# Patient Record
Sex: FEMALE | Race: BLACK OR AFRICAN AMERICAN | NOT HISPANIC OR LATINO | Employment: FULL TIME | URBAN - METROPOLITAN AREA
[De-identification: names, ages, dates, MRNs, and addresses within clinical notes are randomized per-mention and may not be internally consistent; named-entity substitution may affect disease eponyms.]

---

## 2017-01-20 ENCOUNTER — PATIENT MESSAGE (OUTPATIENT)
Dept: BARIATRICS | Facility: CLINIC | Age: 47
End: 2017-01-20

## 2017-01-20 DIAGNOSIS — Z98.84 STATUS POST LAPAROSCOPIC SLEEVE GASTRECTOMY: ICD-10-CM

## 2017-01-24 RX ORDER — OMEPRAZOLE 40 MG/1
40 CAPSULE, DELAYED RELEASE ORAL DAILY
Qty: 30 CAPSULE | Refills: 11 | Status: SHIPPED | OUTPATIENT
Start: 2017-01-24 | End: 2017-09-29 | Stop reason: SDUPTHER

## 2017-02-03 ENCOUNTER — CLINICAL SUPPORT (OUTPATIENT)
Dept: INTERNAL MEDICINE | Facility: CLINIC | Age: 47
End: 2017-02-03
Attending: INTERNAL MEDICINE
Payer: COMMERCIAL

## 2017-02-03 ENCOUNTER — OFFICE VISIT (OUTPATIENT)
Dept: SLEEP MEDICINE | Facility: CLINIC | Age: 47
End: 2017-02-03
Payer: COMMERCIAL

## 2017-02-03 ENCOUNTER — HOSPITAL ENCOUNTER (OUTPATIENT)
Dept: RADIOLOGY | Facility: HOSPITAL | Age: 47
Discharge: HOME | End: 2017-02-03
Attending: INTERNAL MEDICINE
Payer: COMMERCIAL

## 2017-02-03 ENCOUNTER — OFFICE VISIT (OUTPATIENT)
Dept: PULMONOLOGY | Facility: CLINIC | Age: 47
End: 2017-02-03
Payer: COMMERCIAL

## 2017-02-03 ENCOUNTER — HOSPITAL ENCOUNTER (OUTPATIENT)
Dept: RADIOLOGY | Facility: CLINIC | Age: 47
Discharge: HOME | End: 2017-02-03
Attending: INTERNAL MEDICINE
Payer: COMMERCIAL

## 2017-02-03 ENCOUNTER — CLINICAL SUPPORT (OUTPATIENT)
Dept: INFECTIOUS DISEASES | Facility: CLINIC | Age: 47
End: 2017-02-03
Payer: COMMERCIAL

## 2017-02-03 ENCOUNTER — HOSPITAL ENCOUNTER (OUTPATIENT)
Dept: CARDIOLOGY | Facility: CLINIC | Age: 47
Discharge: HOME | End: 2017-02-03
Payer: COMMERCIAL

## 2017-02-03 VITALS
HEIGHT: 65 IN | SYSTOLIC BLOOD PRESSURE: 113 MMHG | DIASTOLIC BLOOD PRESSURE: 77 MMHG | BODY MASS INDEX: 30.64 KG/M2 | WEIGHT: 183.88 LBS | HEART RATE: 71 BPM

## 2017-02-03 VITALS
HEIGHT: 65 IN | BODY MASS INDEX: 30.59 KG/M2 | DIASTOLIC BLOOD PRESSURE: 65 MMHG | WEIGHT: 183.63 LBS | HEART RATE: 64 BPM | SYSTOLIC BLOOD PRESSURE: 100 MMHG

## 2017-02-03 DIAGNOSIS — Z00.00 ROUTINE GENERAL MEDICAL EXAMINATION AT A HEALTH CARE FACILITY: ICD-10-CM

## 2017-02-03 DIAGNOSIS — Z00.00 ANNUAL PHYSICAL EXAM: Primary | ICD-10-CM

## 2017-02-03 DIAGNOSIS — Z00.00 ROUTINE GENERAL MEDICAL EXAMINATION AT A HEALTH CARE FACILITY: Primary | ICD-10-CM

## 2017-02-03 DIAGNOSIS — G47.33 OSA (OBSTRUCTIVE SLEEP APNEA): Primary | ICD-10-CM

## 2017-02-03 LAB
ALBUMIN SERPL BCP-MCNC: 3.7 G/DL
ALP SERPL-CCNC: 41 U/L
ALT SERPL W/O P-5'-P-CCNC: 10 U/L
ANION GAP SERPL CALC-SCNC: 5 MMOL/L
AST SERPL-CCNC: 15 U/L
BILIRUB SERPL-MCNC: 0.4 MG/DL
BUN SERPL-MCNC: 19 MG/DL
CALCIUM SERPL-MCNC: 9.9 MG/DL
CHLORIDE SERPL-SCNC: 107 MMOL/L
CHOLEST/HDLC SERPL: 3.6 {RATIO}
CO2 SERPL-SCNC: 27 MMOL/L
CREAT SERPL-MCNC: 0.9 MG/DL
ERYTHROCYTE [DISTWIDTH] IN BLOOD BY AUTOMATED COUNT: 14.4 %
EST. GFR  (AFRICAN AMERICAN): >60 ML/MIN/1.73 M^2
EST. GFR  (NON AFRICAN AMERICAN): >60 ML/MIN/1.73 M^2
GLUCOSE SERPL-MCNC: 79 MG/DL
HCT VFR BLD AUTO: 38 %
HDL/CHOLESTEROL RATIO: 28.1 %
HDLC SERPL-MCNC: 210 MG/DL
HDLC SERPL-MCNC: 59 MG/DL
HGB BLD-MCNC: 12.2 G/DL
HIV 1+2 AB+HIV1 P24 AG SERPL QL IA: NEGATIVE
LDLC SERPL CALC-MCNC: 133.6 MG/DL
MCH RBC QN AUTO: 26.7 PG
MCHC RBC AUTO-ENTMCNC: 32.1 %
MCV RBC AUTO: 83 FL
NONHDLC SERPL-MCNC: 151 MG/DL
PLATELET # BLD AUTO: 298 K/UL
PMV BLD AUTO: 9.7 FL
POTASSIUM SERPL-SCNC: 4.8 MMOL/L
PROT SERPL-MCNC: 7.5 G/DL
RBC # BLD AUTO: 4.57 M/UL
SODIUM SERPL-SCNC: 139 MMOL/L
TRIGL SERPL-MCNC: 87 MG/DL
WBC # BLD AUTO: 6.91 K/UL

## 2017-02-03 PROCEDURE — 77080 DXA BONE DENSITY AXIAL: CPT | Mod: 26,,, | Performed by: INTERNAL MEDICINE

## 2017-02-03 PROCEDURE — 86703 HIV-1/HIV-2 1 RESULT ANTBDY: CPT

## 2017-02-03 PROCEDURE — 85027 COMPLETE CBC AUTOMATED: CPT

## 2017-02-03 PROCEDURE — 77080 DXA BONE DENSITY AXIAL: CPT | Mod: TC

## 2017-02-03 PROCEDURE — 3074F SYST BP LT 130 MM HG: CPT | Mod: S$GLB,,, | Performed by: PSYCHIATRY & NEUROLOGY

## 2017-02-03 PROCEDURE — 93000 ELECTROCARDIOGRAM COMPLETE: CPT | Mod: S$GLB,,, | Performed by: INTERNAL MEDICINE

## 2017-02-03 PROCEDURE — 3078F DIAST BP <80 MM HG: CPT | Mod: S$GLB,,, | Performed by: INTERNAL MEDICINE

## 2017-02-03 PROCEDURE — 99999 PR PBB SHADOW E&M-EST. PATIENT-LVL III: CPT | Mod: PBBFAC,,, | Performed by: PSYCHIATRY & NEUROLOGY

## 2017-02-03 PROCEDURE — 77067 SCR MAMMO BI INCL CAD: CPT | Mod: 26,,, | Performed by: RADIOLOGY

## 2017-02-03 PROCEDURE — 36415 COLL VENOUS BLD VENIPUNCTURE: CPT

## 2017-02-03 PROCEDURE — 3074F SYST BP LT 130 MM HG: CPT | Mod: S$GLB,,, | Performed by: INTERNAL MEDICINE

## 2017-02-03 PROCEDURE — 80053 COMPREHEN METABOLIC PANEL: CPT

## 2017-02-03 PROCEDURE — 99386 PREV VISIT NEW AGE 40-64: CPT | Mod: S$GLB,,, | Performed by: INTERNAL MEDICINE

## 2017-02-03 PROCEDURE — 90471 IMMUNIZATION ADMIN: CPT | Mod: S$GLB,,, | Performed by: INTERNAL MEDICINE

## 2017-02-03 PROCEDURE — 99999 PR PBB SHADOW E&M-EST. PATIENT-LVL III: CPT | Mod: PBBFAC,,, | Performed by: INTERNAL MEDICINE

## 2017-02-03 PROCEDURE — 99203 OFFICE O/P NEW LOW 30 MIN: CPT | Mod: S$GLB,,, | Performed by: PSYCHIATRY & NEUROLOGY

## 2017-02-03 PROCEDURE — 90715 TDAP VACCINE 7 YRS/> IM: CPT | Mod: S$GLB,,, | Performed by: INTERNAL MEDICINE

## 2017-02-03 PROCEDURE — 80061 LIPID PANEL: CPT

## 2017-02-03 PROCEDURE — 77067 SCR MAMMO BI INCL CAD: CPT | Mod: TC

## 2017-02-03 PROCEDURE — 3078F DIAST BP <80 MM HG: CPT | Mod: S$GLB,,, | Performed by: PSYCHIATRY & NEUROLOGY

## 2017-02-03 NOTE — PROGRESS NOTES
This 46 y.o. female patient presents for the management obstructive sleep apnea.    BASELINE PSG 4/15/15: +ETIENNE, AHI 17.1, low sat 84%, split to effective pressure 10 cm    ETIENNE symptoms: Snoring, disruption sleep at night, sleepiness during the day.    Patient using CPAP nightly at 10 cm; No problems using it  Feels more refreshed after nightly CPAP use; Tiredness resolved with CPAP.  Using Rossy VIew full face mask, because of oral breathing.  No snoring with CPAP    ESS = 2    No nasal congestion    SLEEP ROUTINE:   Bed partner: yes   Time to bed: 8:30 pm - 10 pm   Sleep onset latency: 15 mins   Disruptions or awakenings: 1   Wakeup time: 4:30 am - 7 am   Perceived sleep quality: very good with CPAP   Daytime naps: none      Past Medical History   Diagnosis Date    Genital herpes     Hyperlipidemia     Hypertension     Morbid obesity with BMI of 40.0-44.9, adult     ETIENNE (obstructive sleep apnea)        Past Surgical History   Procedure Laterality Date    Appendectomy  2006     open, took ovary and fallopian tubes as well    Gastrectomy      Hernia repair         Family History   Problem Relation Age of Onset    Hypertension Father     Heart disease Father     Cancer Mother      breast, in remission x 5 years    Breast cancer Mother     No Known Problems Sister     Stroke Maternal Grandmother     Obesity Sister     Obesity Sister     Colon cancer Maternal Uncle     Ovarian cancer Maternal Aunt      maternal grandmother's sister dx age unkown       Social History   Substance Use Topics    Smoking status: Never Smoker    Smokeless tobacco: Never Used    Alcohol use No       ALLERGIES: Reviewed in EPIC    CURRENT MEDICATIONS: Reviewed in EPIC    REVIEW OF SYSTEMS:  Sleep related symptoms as per HPI;    Denies weight gain;    Positive sinus problems;    Denies dyspnea;    Denies palpitations;    Positive acid reflux;    Denies polyuria;    Denies headaches;    Denies mood disturbance;    Denies  "anemia;    Otherwise, a balance of systems reviewed is negative.    PHYSICAL EXAM:  Visit Vitals    /77 (BP Location: Left arm, Patient Position: Sitting, BP Method: Automatic)    Pulse 71    Ht 5' 5" (1.651 m)    Wt 83.4 kg (183 lb 13.8 oz)    BMI 30.6 kg/m2     GENERAL: Well groomed; Normally developed;    I spent greater than 10 minutes during this 20 minute visit in counseling the patient regarding sleep study results, ETIENNE etiology, ramifications, and treatment options.      ASSESSMENT:    1. Obstructive Sleep Apnea, moderate by AHI. The patient symptomatically has snoring and excessive daytime sleepiness with exam findings of a crowded oral airway with medical co-morbidities of hypertension and obesity. Her ETIENNE is well controlled on CPAP         PLAN:    1. CPAP supplies  2. Continue CPAP at 10 cm; effective setting    Education: During our discussion today, we talked about the etiology of obstructive sleep apnea as well as the potential ramifications of untreated sleep apnea, which could include daytime sleepiness, hypertension, heart disease and/or stroke.    Precautions: The patient was advised to abstain from driving should they feel sleepy or drowsy.    Follow up: Annually. MD/NP  "

## 2017-02-03 NOTE — LETTER
February 3, 2017    Joaquín Camargober  [address]  [place] [address]             [facility]  [facility]  [place] [address]  Phone: [phone] Dear Ms. Bruce:    Thank you for allowing me to serve you and perform your [facility] exam on 2/3/2017. This letter will serve as a brief summary of the physical findings and laboratory/studies performed and recommendations at this time. Your assessment is essentially normal.         If you have any questions or concerns, please don't hesitate to call.    Sincerely,        Papa Dela Cruz MD

## 2017-02-03 NOTE — PROGRESS NOTES
Subjective:       Patient ID: Joaquín Bruce is a 46 y.o. female.    Chief Complaint: Annual Exam    HPI   45 yo  from [employer] comes for her periodic health exam. She feels well, she is S/P bariatric surgery (sleeve) at [facility] by  and doing well. Has lost 60 pounds. Only thing that she can't eat is carbonated beverages.   Review of Systems   Constitutional: Negative.    HENT: Negative.    Eyes: Negative.    Respiratory: Negative.         ETIENNE  Uses CPAP   Cardiovascular: Negative.    Gastrointestinal: Negative.         S/P Bariatric Surgery   12/2015   Genitourinary: Negative.    Musculoskeletal: Negative.    Skin: Negative.    Neurological: Negative.    Psychiatric/Behavioral: Negative.    All other systems reviewed and are negative.      Objective:      Physical Exam   Constitutional: She is oriented to person, place, and time. She appears well-developed and well-nourished. No distress.   BMI 30   HENT:   Head: Normocephalic and atraumatic.   Right Ear: External ear normal.   Left Ear: External ear normal.   Nose: Nose normal.   Mouth/Throat: Oropharynx is clear and moist.   Eyes: Conjunctivae and EOM are normal. Pupils are equal, round, and reactive to light.   Neck: Normal range of motion. Neck supple. No JVD present. No thyromegaly present.   Cardiovascular: Normal rate, regular rhythm, normal heart sounds and intact distal pulses.  Exam reveals no gallop.    No murmur heard.  Pulmonary/Chest: Breath sounds normal. No stridor. No respiratory distress. She has no wheezes. She has no rales. She exhibits no tenderness.   Peak flow 400 l/min   Abdominal: Soft. Bowel sounds are normal. She exhibits no distension and no mass. There is no tenderness. There is no rebound and no guarding.   Musculoskeletal: Normal range of motion. She exhibits no edema.   Lymphadenopathy:     She has no cervical adenopathy.   Neurological: She is alert and oriented to person, place, and time. She has  normal reflexes. She displays normal reflexes. No cranial nerve deficit.   Skin: Skin is warm and dry. No rash noted.   Psychiatric: She has a normal mood and affect. Her behavior is normal. Judgment and thought content normal.   Nursing note and vitals reviewed.      Assessment:       No diagnosis found.    Plan:       Except for a slightly elevated cholesterol offset by a high HDL ,all parameters are normal. Bone Mineral Density is Normal. EKG is normal and Mammogram is negative for malignancy. IMP: S/P Bariatric Surgery

## 2017-03-01 ENCOUNTER — PATIENT MESSAGE (OUTPATIENT)
Dept: PULMONOLOGY | Facility: CLINIC | Age: 47
End: 2017-03-01

## 2017-03-03 DIAGNOSIS — G47.00 INSOMNIA, UNSPECIFIED TYPE: Primary | ICD-10-CM

## 2017-03-03 RX ORDER — ZOLPIDEM TARTRATE 10 MG/1
10 TABLET ORAL NIGHTLY PRN
Qty: 30 TABLET | Refills: 2 | Status: SHIPPED | OUTPATIENT
Start: 2017-03-03 | End: 2017-09-05 | Stop reason: SDUPTHER

## 2017-03-10 ENCOUNTER — OFFICE VISIT (OUTPATIENT)
Dept: GASTROENTEROLOGY | Facility: CLINIC | Age: 47
End: 2017-03-10
Payer: COMMERCIAL

## 2017-03-10 ENCOUNTER — TELEPHONE (OUTPATIENT)
Dept: GASTROENTEROLOGY | Facility: CLINIC | Age: 47
End: 2017-03-10

## 2017-03-10 VITALS
DIASTOLIC BLOOD PRESSURE: 76 MMHG | BODY MASS INDEX: 31.26 KG/M2 | HEIGHT: 65 IN | SYSTOLIC BLOOD PRESSURE: 130 MMHG | WEIGHT: 187.63 LBS | HEART RATE: 65 BPM

## 2017-03-10 DIAGNOSIS — K59.00 CONSTIPATION, UNSPECIFIED CONSTIPATION TYPE: Primary | ICD-10-CM

## 2017-03-10 DIAGNOSIS — Z12.11 SCREENING FOR MALIGNANT NEOPLASM OF COLON: ICD-10-CM

## 2017-03-10 PROCEDURE — 3075F SYST BP GE 130 - 139MM HG: CPT | Mod: S$GLB,,, | Performed by: INTERNAL MEDICINE

## 2017-03-10 PROCEDURE — 99204 OFFICE O/P NEW MOD 45 MIN: CPT | Mod: S$GLB,,, | Performed by: INTERNAL MEDICINE

## 2017-03-10 PROCEDURE — 99999 PR PBB SHADOW E&M-EST. PATIENT-LVL III: CPT | Mod: PBBFAC,,, | Performed by: INTERNAL MEDICINE

## 2017-03-10 PROCEDURE — 3078F DIAST BP <80 MM HG: CPT | Mod: S$GLB,,, | Performed by: INTERNAL MEDICINE

## 2017-03-10 PROCEDURE — 1160F RVW MEDS BY RX/DR IN RCRD: CPT | Mod: S$GLB,,, | Performed by: INTERNAL MEDICINE

## 2017-03-10 NOTE — PROGRESS NOTES
Subjective:       Patient ID: Joaquín Bruce is a 46 y.o. female.    Chief Complaint: Constipation    Constipation   This is a recurrent problem. The current episode started more than 1 month ago. The problem has been waxing and waning since onset. Her stool frequency is 2 to 3 times per week. The stool is described as firm. The patient is on a high fiber diet. She does not exercise regularly. There has been adequate water intake. Pertinent negatives include no abdominal pain, back pain, diarrhea, difficulty urinating, fever, nausea, rectal pain or vomiting. Risk factors: none. She has tried laxatives for the symptoms. The treatment provided significant relief. There is no history of endocrine disease or inflammatory bowel disease.     Review of Systems   Constitutional: Negative for appetite change, chills and fever.   HENT: Negative for postnasal drip and trouble swallowing.    Eyes: Negative for pain and redness.   Respiratory: Negative for cough, choking, chest tightness and shortness of breath.    Cardiovascular: Negative for chest pain and leg swelling.   Gastrointestinal: Positive for constipation. Negative for abdominal distention, abdominal pain, anal bleeding, blood in stool, diarrhea, nausea, rectal pain and vomiting.   Endocrine: Negative for cold intolerance and heat intolerance.   Genitourinary: Negative for difficulty urinating and hematuria.   Musculoskeletal: Negative for arthralgias and back pain.   Skin: Negative for color change and pallor.   Allergic/Immunologic: Negative for environmental allergies and food allergies.   Neurological: Negative for dizziness and light-headedness.   Hematological: Negative for adenopathy. Does not bruise/bleed easily.   Psychiatric/Behavioral: Negative for agitation and behavioral problems.       Objective:      Physical Exam   Constitutional: She is oriented to person, place, and time. She appears well-developed and well-nourished. No distress.   HENT:    Head: Normocephalic and atraumatic.   Eyes: Conjunctivae are normal. No scleral icterus.   Neck: Normal range of motion. Neck supple. No tracheal deviation present. No thyromegaly present.   Cardiovascular: Normal rate and regular rhythm.  Exam reveals no gallop and no friction rub.    No murmur heard.  Pulmonary/Chest: Effort normal and breath sounds normal. No respiratory distress. She has no wheezes.   Abdominal: Soft. Bowel sounds are normal. She exhibits no distension. There is no tenderness.   Musculoskeletal:        Right wrist: She exhibits normal range of motion and no tenderness.        Left wrist: She exhibits normal range of motion and no tenderness.   Lymphadenopathy:        Head (right side): No submental and no submandibular adenopathy present.        Head (left side): No submental and no submandibular adenopathy present.   Neurological: She is alert and oriented to person, place, and time.   Skin: Skin is warm and dry. No rash noted. She is not diaphoretic. No erythema.   Psychiatric: She has a normal mood and affect. Her behavior is normal.   Nursing note and vitals reviewed.      Assessment:       1. Constipation, unspecified constipation type    2. Screening for malignant neoplasm of colon        Plan:   1. Continue miralax  2. Screening colonoscopy, average risk (-American age 45)

## 2017-03-10 NOTE — TELEPHONE ENCOUNTER
Patient scheduled for Colonoscopy on Tuesday 03/28/17 with Dr. Self.     Miralax split prep instructions given and understood.   Explained that pt will receive a phone call with final prep instructions and arrival time.

## 2017-03-28 ENCOUNTER — SURGERY (OUTPATIENT)
Age: 47
End: 2017-03-28

## 2017-03-28 ENCOUNTER — HOSPITAL ENCOUNTER (OUTPATIENT)
Facility: HOSPITAL | Age: 47
Discharge: HOME | End: 2017-03-28
Attending: INTERNAL MEDICINE | Admitting: INTERNAL MEDICINE
Payer: COMMERCIAL

## 2017-03-28 ENCOUNTER — ANESTHESIA EVENT (OUTPATIENT)
Dept: ENDOSCOPY | Facility: HOSPITAL | Age: 47
End: 2017-03-28
Payer: COMMERCIAL

## 2017-03-28 ENCOUNTER — ANESTHESIA (OUTPATIENT)
Dept: ENDOSCOPY | Facility: HOSPITAL | Age: 47
End: 2017-03-28
Payer: COMMERCIAL

## 2017-03-28 VITALS
RESPIRATION RATE: 16 BRPM | HEART RATE: 56 BPM | DIASTOLIC BLOOD PRESSURE: 74 MMHG | TEMPERATURE: 99 F | SYSTOLIC BLOOD PRESSURE: 133 MMHG | OXYGEN SATURATION: 100 %

## 2017-03-28 DIAGNOSIS — Z12.11 SCREENING FOR MALIGNANT NEOPLASM OF COLON: Primary | ICD-10-CM

## 2017-03-28 LAB
B-HCG UR QL: NEGATIVE
CTP QC/QA: YES

## 2017-03-28 PROCEDURE — 81025 URINE PREGNANCY TEST: CPT | Performed by: INTERNAL MEDICINE

## 2017-03-28 PROCEDURE — 37000008 HC ANESTHESIA 1ST 15 MINUTES: Performed by: INTERNAL MEDICINE

## 2017-03-28 PROCEDURE — 45378 DIAGNOSTIC COLONOSCOPY: CPT | Performed by: INTERNAL MEDICINE

## 2017-03-28 PROCEDURE — 25000003 PHARM REV CODE 250: Performed by: INTERNAL MEDICINE

## 2017-03-28 PROCEDURE — G0121 COLON CA SCRN NOT HI RSK IND: HCPCS | Performed by: INTERNAL MEDICINE

## 2017-03-28 PROCEDURE — 37000009 HC ANESTHESIA EA ADD 15 MINS: Performed by: INTERNAL MEDICINE

## 2017-03-28 PROCEDURE — 63600175 PHARM REV CODE 636 W HCPCS: Performed by: NURSE ANESTHETIST, CERTIFIED REGISTERED

## 2017-03-28 PROCEDURE — G0121 COLON CA SCRN NOT HI RSK IND: HCPCS | Mod: ,,, | Performed by: INTERNAL MEDICINE

## 2017-03-28 PROCEDURE — 25000003 PHARM REV CODE 250: Performed by: NURSE ANESTHETIST, CERTIFIED REGISTERED

## 2017-03-28 RX ORDER — LIDOCAINE HCL/PF 100 MG/5ML
SYRINGE (ML) INTRAVENOUS
Status: DISCONTINUED | OUTPATIENT
Start: 2017-03-28 | End: 2017-03-28

## 2017-03-28 RX ORDER — SODIUM CHLORIDE 9 MG/ML
INJECTION, SOLUTION INTRAVENOUS CONTINUOUS
Status: DISCONTINUED | OUTPATIENT
Start: 2017-03-28 | End: 2017-03-28 | Stop reason: HOSPADM

## 2017-03-28 RX ORDER — PROPOFOL 10 MG/ML
VIAL (ML) INTRAVENOUS
Status: DISCONTINUED | OUTPATIENT
Start: 2017-03-28 | End: 2017-03-28

## 2017-03-28 RX ORDER — PROPOFOL 10 MG/ML
VIAL (ML) INTRAVENOUS CONTINUOUS PRN
Status: DISCONTINUED | OUTPATIENT
Start: 2017-03-28 | End: 2017-03-28

## 2017-03-28 RX ADMIN — PROPOFOL 150 MCG/KG/MIN: 10 INJECTION, EMULSION INTRAVENOUS at 11:03

## 2017-03-28 RX ADMIN — LIDOCAINE HYDROCHLORIDE 75 MG: 20 INJECTION, SOLUTION INTRAVENOUS at 11:03

## 2017-03-28 RX ADMIN — SODIUM CHLORIDE: 0.9 INJECTION, SOLUTION INTRAVENOUS at 11:03

## 2017-03-28 RX ADMIN — PROPOFOL 50 MG: 10 INJECTION, EMULSION INTRAVENOUS at 11:03

## 2017-03-28 NOTE — DISCHARGE INSTRUCTIONS
Discharge Summary/Instructions for after Colonoscopy with Biopsy/Polypectomy    Joaquín Bruce  3/28/2017  Daja Self MD    Restrictions on Activity:    - Do not drive car or operate machinery until the day after the procedure.  - The following day: return to full activity including work.  - For 3 days: No heavy lifting, straining or running.  - Diet: Eat and drink normally unless instructed otherwise.    Treatment for Common Side Effects:  - Mild abdominal pain and bloating or excessive gas: rest, eat lightly and use a heating pad.     Symptoms to watch for and report to your physician:  1. Severe abdominal pain.  2. Fever within 24 hours after a procedure.  3. A large amount of rectal bleeding. (A small amount of blood from the rectum is not serious, especially if hemorrhoids are present.)  4. Because air was put into your colon during the procedure, expelling large amount of air from your rectum is normal.  5. You may not have a bowel movement for 1-3 days because of the colonoscopy prep. This is normal.  6. Go directly to the emergency room if you notice any of the following:     Chills and/or fever over 101   Persistent vomiting   Severe abdominal pain, other than gas cramps   Severe chest pain   Black, tarry stools   Any bleeding - exceeding one tablespoon    If you have any questions or problems, please call your Physician:    Daja Self MD      Lab Results: Contact Physician's Office      If a complication or emergency situation arises and you are unable to reach your Physician - GO TO THE EMERGENCY ROOM.

## 2017-03-28 NOTE — IP AVS SNAPSHOT
[facility]  [address]  [place] [address]  Phone: [phone]           Patient Discharge Instructions   Our goal is to set you up for success. This packet includes information on your condition, medications, and your home care.  It will help you care for yourself to prevent having to return to the hospital.     Please ask your nurse if you have any questions.      There are many details to remember when preparing to leave the hospital. Here is what you will need to do:    1. Take your medicine. If you are prescribed medications, review your Medication List on the following pages. You may have new medications to  at the pharmacy and others that you'll need to stop taking. Review the instructions for how and when to take your medications. Talk with your doctor or nurses if you are unsure of what to do.     2. Go to your follow-up appointments. Specific follow-up information is listed in the following pages. Your may be contacted by a nurse or clinical provider about future appointments. Be sure we have all of the phone numbers to reach you. Please contact your provider's office if you are unable to make an appointment.     3. Watch for warning signs. Your doctor or nurse will give you detailed warning signs to watch for and when to call for assistance. These instructions may also include educational information about your condition. If you experience any of warning signs to your health, call your doctor.               ** Verify the list of medication(s) below is accurate and up to date. Carry this with you in case of emergency. If your medications have changed, please notify your healthcare provider.             Medication List      CONTINUE taking these medications        Additional Info                      calcium citrate-vitamin D3 500 mg calcium -400 unit Chew   Refills:  0   Dose:  1 tablet    Instructions:  Take 1 tablet by mouth 2 (two) times daily.     Begin Date    AM    Noon     PM    Bedtime       cyanocobalamin (vitamin B-12) 1,000 mcg Subl   Refills:  0   Dose:  1 tablet    Instructions:  Place 1 tablet under the tongue every other day.     Begin Date    AM    Noon    PM    Bedtime       DENAVIR 1 % cream   Refills:  3   Generic drug:  penciclovir    Instructions:  APPLY BID TO BUTTOCKS     Begin Date    AM    Noon    PM    Bedtime       halobetasol 0.05 % ointment   Commonly known as:  ULTRAVATE   Refills:  3    Instructions:  APPLY BID AA     Begin Date    AM    Noon    PM    Bedtime       MELATONIN (WITH B6) ORAL   Refills:  0    Instructions:  Take by mouth.     Begin Date    AM    Noon    PM    Bedtime       omeprazole 40 MG capsule   Commonly known as:  PRILOSEC   Quantity:  30 capsule   Refills:  11   Dose:  40 mg    Instructions:  Take 1 capsule (40 mg total) by mouth once daily.     Begin Date    AM    Noon    PM    Bedtime       ondansetron 8 MG Tbdl   Commonly known as:  ZOFRAN-ODT   Quantity:  30 tablet   Refills:  1   Dose:  8 mg    Instructions:  Take 1 tablet (8 mg total) by mouth every 6 (six) hours as needed.     Begin Date    AM    Noon    PM    Bedtime       ONE DAILY MULTIVITAMIN per tablet   Refills:  0   Dose:  1 tablet   Generic drug:  multivitamin    Instructions:  Take 1 tablet by mouth once daily.     Begin Date    AM    Noon    PM    Bedtime       valacyclovir 1000 MG tablet   Commonly known as:  VALTREX   Refills:  3    Instructions:  TK 1 T PO  HS     Begin Date    AM    Noon    PM    Bedtime       zolpidem 10 mg Tab   Commonly known as:  AMBIEN   Quantity:  30 tablet   Refills:  2   Dose:  10 mg    Instructions:  Take 1 tablet (10 mg total) by mouth nightly as needed.     Begin Date    AM    Noon    PM    Bedtime                  Please bring to all follow up appointments:    1. A copy of your discharge instructions.  2. All medicines you are currently taking in their original bottles.  3. Identification and insurance card.    Please arrive 15 minutes  ahead of scheduled appointment time.    Please call 24 hours in advance if you must reschedule your appointment and/or time.          Discharge Instructions     Future Orders    Activity as tolerated     Diet general     Questions:    Total calories:      Fat restriction, if any:      Protein restriction, if any:      Na restriction, if any:      Fluid restriction:      Additional restrictions:          Discharge Instructions       Discharge Summary/Instructions for after Colonoscopy with Biopsy/Polypectomy    Joaquín Bruce  3/28/2017  Daja Self MD    Restrictions on Activity:    - Do not drive car or operate machinery until the day after the procedure.  - The following day: return to full activity including work.  - For 3 days: No heavy lifting, straining or running.  - Diet: Eat and drink normally unless instructed otherwise.    Treatment for Common Side Effects:  - Mild abdominal pain and bloating or excessive gas: rest, eat lightly and use a heating pad.     Symptoms to watch for and report to your physician:  1. Severe abdominal pain.  2. Fever within 24 hours after a procedure.  3. A large amount of rectal bleeding. (A small amount of blood from the rectum is not serious, especially if hemorrhoids are present.)  4. Because air was put into your colon during the procedure, expelling large amount of air from your rectum is normal.  5. You may not have a bowel movement for 1-3 days because of the colonoscopy prep. This is normal.  6. Go directly to the emergency room if you notice any of the following:     Chills and/or fever over 101   Persistent vomiting   Severe abdominal pain, other than gas cramps   Severe chest pain   Black, tarry stools   Any bleeding - exceeding one tablespoon    If you have any questions or problems, please call your Physician:    Daja Self MD      Lab Results: Contact Physician's Office      If a complication or emergency situation arises and you are unable to reach  your Physician - GO TO THE EMERGENCY ROOM.            Admission Information     Date & Time Provider Department CSN    3/28/2017  9:34 AM Daja Self MD [facility] [id number]      Care Providers     Provider Role Specialty Primary office phone    Daja Self MD Attending Provider Gastroenterology [phone]    Daja Self MD Surgeon  Gastroenterology [phone]      Your Vitals Were     BP Pulse Temp Resp Last Period SpO2    119/75 (BP Location: Right arm, Patient Position: Lying, BP Method: Automatic) 60 98.5 °F (36.9 °C) (Oral) 12 03/05/2017 100%      Recent Lab Values        3/6/2015 6/25/2015                       10:56 AM  7:23 AM          A1C 5.1 5.3                     Allergies as of 3/28/2017     No Known Allergies      [facility]     [facility] Nurse Care Line - 24/7 Assistance  Unless otherwise directed by your provider, please contact [facility], our nurse care line that is available for 24/7 assistance.     Registered nurses in the [facility] provide clinical advisement, health education, appointment booking, and other advisory services.  Call for this free service at [phone].        Advance Directives     An advance directive is a document which, in the event you are no longer able to make decisions for yourself, tells your healthcare team what kind of treatment you do or do not want to receive, or who you would like to make those decisions for you.  If you do not currently have an advance directive, [facility] encourages you to create one.  For more information call:  [phone]), [phone] ([phone]),  or log on to [email or web address].        Language Assistance Services     ATTENTION: Language assistance services are available, free of charge. Please call [phone].      ATENCIÓN: Si habla español, tiene a orona disposición servicios gratuitos de asistencia lingüística. Llame al [phone].     [facility]  Ý: N?u b?n nói Ti?ng Vi?t, có các d?ch v? h? tr? ngôn ng? mi?n phí dành cho b?n. G?i s? [phone].         [facility] complies with applicable Federal civil rights laws and does not discriminate on the basis of race, color, national origin, age, disability, or sex.

## 2017-03-28 NOTE — ANESTHESIA POSTPROCEDURE EVALUATION
Anesthesia Post Evaluation    Patient: Joaquín Bruce    Procedure(s) Performed: Procedure(s) (LRB):  COLONOSCOPY - Miralax split prep (N/A)    Final Anesthesia Type: MAC  Patient location during evaluation: GI PACU  Patient participation: Yes- Able to Participate  Level of consciousness: awake and alert and oriented  Post-procedure vital signs: reviewed and stable  Pain management: adequate  Airway patency: patent  PONV status at discharge: No PONV  Anesthetic complications: no      Cardiovascular status: blood pressure returned to baseline, hemodynamically stable and stable  Respiratory status: room air, unassisted and spontaneous ventilation  Hydration status: euvolemic  Follow-up not needed.        Visit Vitals    LMP 03/05/2017    SpO2 100%    Breastfeeding No       Pain/Myranda Score: Pain Assessment Performed: Yes (3/28/2017 10:14 AM)  Presence of Pain: denies (3/28/2017 10:14 AM)

## 2017-03-28 NOTE — TRANSFER OF CARE
Anesthesia Transfer of Care Note    Patient: Joaquín Bruce    Procedure(s) Performed: Procedure(s) (LRB):  COLONOSCOPY - Miralax split prep (N/A)    Patient location: GI    Anesthesia Type: MAC    Transport from OR: Transported from OR on room air with adequate spontaneous ventilation    Post pain: adequate analgesia    Post assessment: no apparent anesthetic complications    Post vital signs: stable    Level of consciousness: awake, alert and oriented    Nausea/Vomiting: no nausea/vomiting    Complications: none          Last vitals:   Visit Vitals    LMP 03/05/2017    SpO2 100%    Breastfeeding No

## 2017-03-28 NOTE — ANESTHESIA PREPROCEDURE EVALUATION
03/28/2017  Joaquín Bruce is a 46 y.o., female for colonoscopy under MAC    [facility] Anesthesia Evaluation     I have reviewed the Nursing Notes.   I have reviewed the Medications.     Review of Systems  Anesthesia Hx:  History of prior surgery of interest to airway management or planning: (lap gastric sleeve 2015)   Social:  No Alcohol Use, Non-Smoker   Cardiovascular:   Hypertension hyperlipidemia    Pulmonary:   Sleep Apnea    Hepatic/GI:   Bowel Prep.        Physical Exam  General:  Obesity       Chest/Lungs:  Chest/Lungs Clear    Heart/Vascular:  Heart Findings: Normal            Anesthesia Plan  Type of Anesthesia, risks & benefits discussed:  Anesthesia Type:  MAC  Patient's Preference:   Intra-op Monitoring Plan:   Intra-op Monitoring Plan Comments:   Post Op Pain Control Plan:   Post Op Pain Control Plan Comments:   Induction:    Beta Blocker:  Patient is not currently on a Beta-Blocker (No further documentation required).       Informed Consent: Patient understands risks and agrees with Anesthesia plan.  Questions answered. Anesthesia consent signed with patient.  ASA Score: 2     Day of Surgery Review of History & Physical:            Ready For Surgery From Anesthesia Perspective.

## 2017-07-03 ENCOUNTER — OFFICE VISIT (OUTPATIENT)
Dept: BARIATRICS | Facility: CLINIC | Age: 47
End: 2017-07-03
Payer: COMMERCIAL

## 2017-07-03 ENCOUNTER — DOCUMENTATION ONLY (OUTPATIENT)
Dept: BARIATRICS | Facility: CLINIC | Age: 47
End: 2017-07-03

## 2017-07-03 VITALS
WEIGHT: 193.13 LBS | HEART RATE: 66 BPM | HEIGHT: 65 IN | SYSTOLIC BLOOD PRESSURE: 123 MMHG | BODY MASS INDEX: 32.18 KG/M2 | DIASTOLIC BLOOD PRESSURE: 56 MMHG

## 2017-07-03 DIAGNOSIS — E66.9 OBESITY, CLASS I, BMI 30-34.9: Primary | ICD-10-CM

## 2017-07-03 DIAGNOSIS — Z98.84 S/P LAPAROSCOPIC SLEEVE GASTRECTOMY: ICD-10-CM

## 2017-07-03 DIAGNOSIS — E65 ABDOMINAL PANNUS: ICD-10-CM

## 2017-07-03 DIAGNOSIS — B37.9 YEAST INFECTION: ICD-10-CM

## 2017-07-03 PROCEDURE — 99999 PR PBB SHADOW E&M-EST. PATIENT-LVL III: CPT | Mod: PBBFAC,,, | Performed by: PHYSICIAN ASSISTANT

## 2017-07-03 PROCEDURE — 99214 OFFICE O/P EST MOD 30 MIN: CPT | Mod: S$GLB,,, | Performed by: PHYSICIAN ASSISTANT

## 2017-07-03 NOTE — PROGRESS NOTES
BARIATRIC POST-OPERATIVE FOLLOW UP:    HISTORY OF PRESENT ILLNESS: Joaquín Bruce is a 46 y.o. female with a Body mass index is 32.14 kg/m². who presents for a 1 year follow up s/p Lap Sleeve with Dr. Ty on 12/14/2015.  She is doing well and tolerating the diet without difficulty.  She is feeling hungrier since working out and adding in pizza and alcohol.  She went up in weight, about 11 lbs.  She wants to get to 170 lbs.   She complains of lower back pain and skin rashes and breakdown under her abdominal pannus.  She has lost 48 lbs, approximately 47% of her excess weight.  She has no other complaints.     Denies: nausea, vomiting, abdominal pain, changes in bowel movement pattern, fever, chills, dysphagia, chest pain, and shortness of breath.    Review of Systems   Constitutional: Negative for chills, fever and malaise/fatigue.   Respiratory: Negative for cough, hemoptysis and shortness of breath.    Cardiovascular: Negative for chest pain, palpitations and leg swelling.   Gastrointestinal: Negative for abdominal pain, blood in stool, constipation, diarrhea, heartburn, melena, nausea and vomiting.   Genitourinary: Negative for dysuria and hematuria.   Musculoskeletal: Positive for back pain (lower).   Skin: Positive for rash (with skin breakdown under the abdomen).   Neurological: Negative for weakness and headaches.   Psychiatric/Behavioral: Negative for depression.       EXERCISE & VITAMINS:  See Bariatric Assessment    MEDICATIONS/ALLERGIES:  Have been reviewed.    DIET:  Regular Bariatric Diet.  Recall:  Br: shake (30g), Sn: coffee, greek yogurt, banana (12g), Kate: pancakes and egg (8g), Di: nachos (5 g).  ~55-60 grams daily protein.    Physical Exam   Constitutional: She is oriented to person, place, and time. She appears well-developed and well-nourished.   HENT:   Head: Normocephalic and atraumatic.   Cardiovascular: Normal rate and regular rhythm.    Pulmonary/Chest: Effort normal and breath  sounds normal.   Abdominal: Soft. Bowel sounds are normal. She exhibits no distension and no mass. There is no tenderness. There is no rebound and no guarding.       WHSS   Musculoskeletal: She exhibits no edema.   Neurological: She is alert and oriented to person, place, and time.   Skin: Skin is warm and dry. Rash (under abdominal pannus) noted. No erythema. No pallor.   Psychiatric: She has a normal mood and affect.   Nursing note and vitals reviewed.      ASSESSMENT:  - Large abdominal pannus with skin breakdown  - Body mass index is 32.14 kg/m². s/p sleeve gastrectomy on 12/14/2015.  - Co-morbidities: HTN (resolved, off medication), HLD (stable), ETIENNE (stable)  - Good Weight loss, 48 lbs, 57% EWL  - Good Exercise regimen  - Good Vitamin Regimen  - Good Diet    PLAN:  - [facility] consult  - Discussed diet today to include maintaining 80 grams daily protein and keeping out sugars, starches and ETOH.  - Emphasized the importance of regular exercise and adherence to bariatric diet to achieve maximum weight loss.  Gave her several tips on how to maintain high levels of protein to include a variety of foods that are not starchy carbohydrates.  - Encouraged patient to continue regular exercise.  - Continue daily vitamins and medications.  - Anti-Acid medication, Omeprazole daily as needed.  - Miralax daily for constipation, no fiber.  - RTC in 2 month or sooner if needed.  - Call the office for any issues.  - Check labs at annual visit.    25 minute visit, over 50% of time spent counseling patient face to face on diet, exercise, and weight loss.

## 2017-07-03 NOTE — PATIENT INSTRUCTIONS
From your highest weight of 264 lbs you have lost 70 lbs.  Since surgery you are down 48 lbs, approximately 47% of your excess weight.                  - Log meals on an Shanna like MyFitness Pal or any other.  Aim for  g protein daily & 800-1000 calories.  - 100% no starches as long as you want to lose weight.   -- Follow Jackelyn on [address] for recipes, new products and motivation.  bariatricpa  - 2 shakes daily or 1 shake and 1 protein water will help you get the protein higher.

## 2017-09-05 DIAGNOSIS — G47.00 INSOMNIA, UNSPECIFIED TYPE: ICD-10-CM

## 2017-09-05 RX ORDER — ZOLPIDEM TARTRATE 10 MG/1
TABLET ORAL
Qty: 30 TABLET | Refills: 0 | Status: SHIPPED | OUTPATIENT
Start: 2017-09-05 | End: 2018-02-19 | Stop reason: SDUPTHER

## 2017-09-29 ENCOUNTER — OFFICE VISIT (OUTPATIENT)
Dept: BARIATRICS | Facility: CLINIC | Age: 47
End: 2017-09-29
Payer: COMMERCIAL

## 2017-09-29 VITALS
HEART RATE: 72 BPM | BODY MASS INDEX: 32.32 KG/M2 | DIASTOLIC BLOOD PRESSURE: 71 MMHG | HEIGHT: 65 IN | SYSTOLIC BLOOD PRESSURE: 126 MMHG | WEIGHT: 194 LBS

## 2017-09-29 DIAGNOSIS — Z98.84 STATUS POST LAPAROSCOPIC SLEEVE GASTRECTOMY: ICD-10-CM

## 2017-09-29 DIAGNOSIS — R63.4 WEIGHT LOSS: Primary | ICD-10-CM

## 2017-09-29 DIAGNOSIS — Z98.84 S/P LAPAROSCOPIC SLEEVE GASTRECTOMY: ICD-10-CM

## 2017-09-29 PROCEDURE — 99999 PR PBB SHADOW E&M-EST. PATIENT-LVL III: CPT | Mod: PBBFAC,,, | Performed by: PHYSICIAN ASSISTANT

## 2017-09-29 PROCEDURE — 3008F BODY MASS INDEX DOCD: CPT | Mod: S$GLB,,, | Performed by: PHYSICIAN ASSISTANT

## 2017-09-29 PROCEDURE — 3078F DIAST BP <80 MM HG: CPT | Mod: S$GLB,,, | Performed by: PHYSICIAN ASSISTANT

## 2017-09-29 PROCEDURE — 99213 OFFICE O/P EST LOW 20 MIN: CPT | Mod: S$GLB,,, | Performed by: PHYSICIAN ASSISTANT

## 2017-09-29 PROCEDURE — 3074F SYST BP LT 130 MM HG: CPT | Mod: S$GLB,,, | Performed by: PHYSICIAN ASSISTANT

## 2017-09-29 RX ORDER — OMEPRAZOLE 40 MG/1
40 CAPSULE, DELAYED RELEASE ORAL DAILY
Qty: 90 CAPSULE | Refills: 3 | Status: SHIPPED | OUTPATIENT
Start: 2017-09-29 | End: 2018-09-28 | Stop reason: SDUPTHER

## 2017-09-29 NOTE — PROGRESS NOTES
BARIATRIC POST-OPERATIVE FOLLOW UP:    HISTORY OF PRESENT ILLNESS: Joaquín Bruce is a 47 y.o. female with a Body mass index is 32.28 kg/m². who presents for slow weight gain.  She is interested in plastic surgery which she has an approval letter from her insurance.  a 1 year follow up s/p Lap Sleeve with Dr. Ty on 12/14/2015.  She is doing well and tolerating the diet without difficulty.  She has panniculectomy planned.  She requests a new referral for a different plastic surgeon.  She complains of lower back pain and skin rashes and breakdown under her abdominal pannus.  She has lost 48 lbs, approximately 47% of her excess weight.  She has no other complaints.     Denies: nausea, vomiting, abdominal pain, changes in bowel movement pattern, fever, chills, dysphagia, chest pain, and shortness of breath.    Review of Systems   Constitutional: Negative for chills, fever and malaise/fatigue.   Respiratory: Negative for cough, hemoptysis and shortness of breath.    Cardiovascular: Negative for chest pain, palpitations and leg swelling.   Gastrointestinal: Negative for abdominal pain, blood in stool, constipation, diarrhea, heartburn, melena, nausea and vomiting.   Genitourinary: Negative for dysuria and hematuria.   Musculoskeletal: Positive for back pain (lower).   Skin: Positive for rash (with skin breakdown under the abdomen).   Neurological: Negative for weakness and headaches.   Psychiatric/Behavioral: Negative for depression.       EXERCISE & VITAMINS:  See Bariatric Assessment    MEDICATIONS/ALLERGIES:  Have been reviewed.    DIET:  Regular Bariatric Diet.  Recall:  Br: shake (30g), Sn: coffee, greek yogurt, banana (12g), Kate: pancakes and egg (8g), Di: nachos (5 g).  ~55-60 grams daily protein.    Physical Exam   Constitutional: She is oriented to person, place, and time. She appears well-developed and well-nourished.   HENT:   Head: Normocephalic and atraumatic.   Cardiovascular: Normal rate and  regular rhythm.    Pulmonary/Chest: Effort normal and breath sounds normal.   Abdominal: Soft. Bowel sounds are normal. She exhibits no distension and no mass. There is no tenderness. There is no rebound and no guarding.       WHSS   Musculoskeletal: She exhibits no edema.   Neurological: She is alert and oriented to person, place, and time.   Skin: Skin is warm and dry. Rash (under abdominal pannus) noted. No erythema. No pallor.   Psychiatric: She has a normal mood and affect.   Nursing note and vitals reviewed.      ASSESSMENT:  - Large abdominal pannus with skin breakdown  - Body mass index is 32.28 kg/m². s/p sleeve gastrectomy on 12/14/2015.  - Co-morbidities: HTN (resolved, off medication), HLD (stable), ETIENNE (stable)  - Good Weight loss, 48 lbs, 57% EWL  - Good Exercise regimen  - Good Vitamin Regimen  - Good Diet    PLAN:  - Plastic Surgery consult  - Discussed diet today to include maintaining 80 grams daily protein and keeping out sugars, starches and ETOH.  - Emphasized the importance of regular exercise and adherence to bariatric diet to achieve maximum weight loss.  Gave her several tips on how to maintain high levels of protein to include a variety of foods that are not starchy carbohydrates.  - D/C eating any foods that contain: flour, rice, potato, corn, and peas for best long term weight loss.   - Encouraged patient to continue regular exercise.  - Continue daily vitamins and medications.  - Anti-Acid medication, Omeprazole daily as needed.  - Miralax daily for constipation, no fiber.  - RTC in 3-6 month or sooner if needed.  - Call the office for any issues.  - Check labs at annual visit.    15 minute visit, over 50% of time spent counseling patient face to face on diet, exercise, and weight loss.

## 2018-01-29 DIAGNOSIS — Z00.00 ROUTINE GENERAL MEDICAL EXAMINATION AT A HEALTH CARE FACILITY: Primary | ICD-10-CM

## 2018-02-02 ENCOUNTER — HOSPITAL ENCOUNTER (OUTPATIENT)
Dept: RADIOLOGY | Facility: HOSPITAL | Age: 48
Discharge: HOME | End: 2018-02-02
Attending: INTERNAL MEDICINE
Payer: COMMERCIAL

## 2018-02-02 ENCOUNTER — OFFICE VISIT (OUTPATIENT)
Dept: PULMONOLOGY | Facility: CLINIC | Age: 48
End: 2018-02-02
Payer: COMMERCIAL

## 2018-02-02 ENCOUNTER — CLINICAL SUPPORT (OUTPATIENT)
Dept: INTERNAL MEDICINE | Facility: CLINIC | Age: 48
End: 2018-02-02
Payer: COMMERCIAL

## 2018-02-02 ENCOUNTER — HOSPITAL ENCOUNTER (OUTPATIENT)
Dept: CARDIOLOGY | Facility: CLINIC | Age: 48
Discharge: HOME | End: 2018-02-02
Payer: COMMERCIAL

## 2018-02-02 DIAGNOSIS — Z00.00 ROUTINE GENERAL MEDICAL EXAMINATION AT A HEALTH CARE FACILITY: Primary | ICD-10-CM

## 2018-02-02 DIAGNOSIS — Z00.00 ROUTINE GENERAL MEDICAL EXAMINATION AT A HEALTH CARE FACILITY: ICD-10-CM

## 2018-02-02 DIAGNOSIS — Z12.31 VISIT FOR SCREENING MAMMOGRAM: ICD-10-CM

## 2018-02-02 DIAGNOSIS — Z00.00 ANNUAL PHYSICAL EXAM: Primary | ICD-10-CM

## 2018-02-02 LAB
ALBUMIN SERPL BCP-MCNC: 3.6 G/DL
ALP SERPL-CCNC: 45 U/L
ALT SERPL W/O P-5'-P-CCNC: 11 U/L
ANION GAP SERPL CALC-SCNC: 8 MMOL/L
AST SERPL-CCNC: 15 U/L
BILIRUB SERPL-MCNC: 0.4 MG/DL
BUN SERPL-MCNC: 14 MG/DL
CALCIUM SERPL-MCNC: 9.9 MG/DL
CHLORIDE SERPL-SCNC: 106 MMOL/L
CHOLEST SERPL-MCNC: 205 MG/DL
CHOLEST/HDLC SERPL: 3.2 {RATIO}
CO2 SERPL-SCNC: 27 MMOL/L
CREAT SERPL-MCNC: 0.8 MG/DL
ERYTHROCYTE [DISTWIDTH] IN BLOOD BY AUTOMATED COUNT: 17.5 %
EST. GFR  (AFRICAN AMERICAN): >60 ML/MIN/1.73 M^2
EST. GFR  (NON AFRICAN AMERICAN): >60 ML/MIN/1.73 M^2
GLUCOSE SERPL-MCNC: 77 MG/DL
HCT VFR BLD AUTO: 36.8 %
HDLC SERPL-MCNC: 65 MG/DL
HDLC SERPL: 31.7 %
HGB BLD-MCNC: 11.5 G/DL
LDLC SERPL CALC-MCNC: 125.2 MG/DL
MCH RBC QN AUTO: 24.7 PG
MCHC RBC AUTO-ENTMCNC: 31.3 G/DL
MCV RBC AUTO: 79 FL
NONHDLC SERPL-MCNC: 140 MG/DL
PLATELET # BLD AUTO: 334 K/UL
PMV BLD AUTO: 10.1 FL
POTASSIUM SERPL-SCNC: 4.1 MMOL/L
PROT SERPL-MCNC: 7.6 G/DL
RBC # BLD AUTO: 4.65 M/UL
SODIUM SERPL-SCNC: 141 MMOL/L
TRIGL SERPL-MCNC: 74 MG/DL
WBC # BLD AUTO: 6.76 K/UL

## 2018-02-02 PROCEDURE — 85027 COMPLETE CBC AUTOMATED: CPT

## 2018-02-02 PROCEDURE — 99396 PREV VISIT EST AGE 40-64: CPT | Mod: S$GLB,,, | Performed by: INTERNAL MEDICINE

## 2018-02-02 PROCEDURE — 80061 LIPID PANEL: CPT

## 2018-02-02 PROCEDURE — 36415 COLL VENOUS BLD VENIPUNCTURE: CPT

## 2018-02-02 PROCEDURE — 80053 COMPREHEN METABOLIC PANEL: CPT

## 2018-02-02 PROCEDURE — 99999 PR PBB SHADOW E&M-EST. PATIENT-LVL II: CPT | Mod: PBBFAC,,, | Performed by: INTERNAL MEDICINE

## 2018-02-02 PROCEDURE — 77067 SCR MAMMO BI INCL CAD: CPT | Mod: 26,,, | Performed by: RADIOLOGY

## 2018-02-02 PROCEDURE — 77067 SCR MAMMO BI INCL CAD: CPT | Mod: TC

## 2018-02-02 PROCEDURE — 77063 BREAST TOMOSYNTHESIS BI: CPT | Mod: 26,,, | Performed by: RADIOLOGY

## 2018-02-02 PROCEDURE — 93000 ELECTROCARDIOGRAM COMPLETE: CPT | Mod: S$GLB,,, | Performed by: INTERNAL MEDICINE

## 2018-02-02 NOTE — PROGRESS NOTES
Subjective:       Patient ID: Joaquín Bruce is a 47 y.o. female.    Chief Complaint: No chief complaint on file.    HPI   46 yo  from [place] comes for her periodic health exam. She feels and looks well. She originally worked int [employer] in [place] after graduating from college and then was transferred to [place] and works in the [employer] division. She is originally from [facility].  She had a gastric sleeve procedure performed in December, 2015 and has done well.. Lost approximately 65 pounds.  Had a tummy tuck this past summer.   Review of Systems   Constitutional: Negative.    HENT: Negative.    Eyes: Negative.    Respiratory: Negative.    Cardiovascular: Negative.    Gastrointestinal: Negative.         S/P Bariatric surgery: 12/15   Genitourinary: Negative.    Musculoskeletal: Negative.    Skin: Negative.    Neurological: Negative.    Psychiatric/Behavioral: Negative.    All other systems reviewed and are negative.      Objective:      Physical Exam   Constitutional: She is oriented to person, place, and time. She appears well-developed and well-nourished. No distress.   HENT:   Head: Normocephalic and atraumatic.   Right Ear: External ear normal.   Left Ear: External ear normal.   Nose: Nose normal.   Mouth/Throat: Oropharynx is clear and moist.   Eyes: Conjunctivae and EOM are normal. Pupils are equal, round, and reactive to light.   Neck: Normal range of motion. Neck supple. No JVD present. No thyromegaly present.   Cardiovascular: Normal rate, regular rhythm, normal heart sounds and intact distal pulses.  Exam reveals no gallop.    No murmur heard.  Pulmonary/Chest: Breath sounds normal. No stridor. No respiratory distress. She has no wheezes. She has no rales. She exhibits no tenderness.   Peak flow 400 l/min   Abdominal: Soft. Bowel sounds are normal. She exhibits no distension and no mass. There is no tenderness. There is no rebound and no guarding.    Musculoskeletal: Normal range of motion. She exhibits no edema.   Lymphadenopathy:     She has no cervical adenopathy.   Neurological: She is alert and oriented to person, place, and time. She has normal reflexes. She displays normal reflexes. No cranial nerve deficit.   Skin: Skin is warm and dry. No rash noted.   Psychiatric: She has a normal mood and affect. Her behavior is normal. Judgment and thought content normal.   Nursing note and vitals reviewed.      Assessment:       No diagnosis found.    Plan:           Labs: Very mild microcytic anemia, suggested taking he 65 mg of iron daily for two months, currently only 4 days a week. EKG is normal and Mammogram is negative for malignancy.

## 2018-02-02 NOTE — LETTER
February 2, 2018    Joaquín Bruce  [address]  [place] [address]             [facility]  [facility]  [place] [address]  Phone: [phone] Dear Ms. Bruce:    Thank you for allowing me to serve you and perform your Executive Health exam on 2/2/2018. This letter will serve as a brief summary of the physical findings and laboratory/studies performed and recommendations at this time. Except for a very mild anemia this is a normal exam. I suggested that you take  The iron replacement daily for two months and that should improve your iron stores.         If you have any questions or concerns, please don't hesitate to call.    Sincerely,        Papa Dela Cruz MD

## 2018-02-19 ENCOUNTER — PATIENT MESSAGE (OUTPATIENT)
Dept: PULMONOLOGY | Facility: CLINIC | Age: 48
End: 2018-02-19

## 2018-02-19 DIAGNOSIS — G47.00 INSOMNIA, UNSPECIFIED TYPE: ICD-10-CM

## 2018-02-19 RX ORDER — ZOLPIDEM TARTRATE 10 MG/1
TABLET ORAL
Qty: 30 TABLET | Refills: 3 | Status: SHIPPED | OUTPATIENT
Start: 2018-02-19 | End: 2018-05-17 | Stop reason: SDUPTHER

## 2018-02-19 RX ORDER — ZOLPIDEM TARTRATE 10 MG/1
TABLET ORAL
Qty: 30 TABLET | Refills: 0 | Status: SHIPPED | OUTPATIENT
Start: 2018-02-19 | End: 2018-02-19 | Stop reason: SDUPTHER

## 2018-05-17 ENCOUNTER — PATIENT MESSAGE (OUTPATIENT)
Dept: PULMONOLOGY | Facility: CLINIC | Age: 48
End: 2018-05-17

## 2018-05-17 DIAGNOSIS — G47.00 INSOMNIA, UNSPECIFIED TYPE: ICD-10-CM

## 2018-05-17 RX ORDER — ZOLPIDEM TARTRATE 10 MG/1
TABLET ORAL
Qty: 30 TABLET | Refills: 3 | Status: SHIPPED | OUTPATIENT
Start: 2018-05-17 | End: 2018-12-19 | Stop reason: SDUPTHER

## 2018-07-09 ENCOUNTER — PATIENT MESSAGE (OUTPATIENT)
Dept: SLEEP MEDICINE | Facility: CLINIC | Age: 48
End: 2018-07-09

## 2018-07-09 DIAGNOSIS — G47.33 OSA (OBSTRUCTIVE SLEEP APNEA): Primary | ICD-10-CM

## 2018-09-28 ENCOUNTER — PATIENT MESSAGE (OUTPATIENT)
Dept: PULMONOLOGY | Facility: CLINIC | Age: 48
End: 2018-09-28

## 2018-09-28 DIAGNOSIS — K21.9 GASTROESOPHAGEAL REFLUX DISEASE, ESOPHAGITIS PRESENCE NOT SPECIFIED: Primary | ICD-10-CM

## 2018-09-28 DIAGNOSIS — Z98.84 STATUS POST LAPAROSCOPIC SLEEVE GASTRECTOMY: ICD-10-CM

## 2018-09-28 RX ORDER — OMEPRAZOLE 40 MG/1
40 CAPSULE, DELAYED RELEASE ORAL DAILY
Qty: 90 CAPSULE | Refills: 3 | Status: SHIPPED | OUTPATIENT
Start: 2018-09-28 | End: 2019-10-22 | Stop reason: SDUPTHER

## 2018-12-18 ENCOUNTER — PATIENT MESSAGE (OUTPATIENT)
Dept: PULMONOLOGY | Facility: CLINIC | Age: 48
End: 2018-12-18

## 2018-12-18 ENCOUNTER — PATIENT MESSAGE (OUTPATIENT)
Dept: BARIATRICS | Facility: CLINIC | Age: 48
End: 2018-12-18

## 2018-12-19 ENCOUNTER — TELEPHONE (OUTPATIENT)
Dept: BARIATRICS | Facility: CLINIC | Age: 48
End: 2018-12-19

## 2018-12-19 DIAGNOSIS — E66.9 OBESITY, CLASS I, BMI 30-34.9: ICD-10-CM

## 2018-12-19 DIAGNOSIS — G47.00 INSOMNIA, UNSPECIFIED TYPE: ICD-10-CM

## 2018-12-19 DIAGNOSIS — E78.5 HYPERLIPIDEMIA, UNSPECIFIED HYPERLIPIDEMIA TYPE: ICD-10-CM

## 2018-12-19 DIAGNOSIS — Z98.84 S/P LAPAROSCOPIC SLEEVE GASTRECTOMY: Primary | ICD-10-CM

## 2018-12-19 DIAGNOSIS — G47.33 OSA (OBSTRUCTIVE SLEEP APNEA): ICD-10-CM

## 2018-12-19 RX ORDER — ZOLPIDEM TARTRATE 10 MG/1
TABLET ORAL
Qty: 30 TABLET | Refills: 3 | Status: SHIPPED | OUTPATIENT
Start: 2018-12-19 | End: 2019-09-16 | Stop reason: SDUPTHER

## 2018-12-19 NOTE — TELEPHONE ENCOUNTER
Called patient to schedule annual f/u and labs. Patient understands date, time and location of appointment.

## 2018-12-20 ENCOUNTER — LAB VISIT (OUTPATIENT)
Dept: LAB | Facility: HOSPITAL | Age: 48
End: 2018-12-20
Payer: COMMERCIAL

## 2018-12-20 ENCOUNTER — OFFICE VISIT (OUTPATIENT)
Dept: BARIATRICS | Facility: CLINIC | Age: 48
End: 2018-12-20
Payer: COMMERCIAL

## 2018-12-20 VITALS
BODY MASS INDEX: 31.33 KG/M2 | HEART RATE: 98 BPM | HEIGHT: 65 IN | DIASTOLIC BLOOD PRESSURE: 72 MMHG | WEIGHT: 188.06 LBS | SYSTOLIC BLOOD PRESSURE: 114 MMHG

## 2018-12-20 DIAGNOSIS — R63.4 WEIGHT LOSS: ICD-10-CM

## 2018-12-20 DIAGNOSIS — Z98.84 S/P LAPAROSCOPIC SLEEVE GASTRECTOMY: ICD-10-CM

## 2018-12-20 DIAGNOSIS — E78.5 HYPERLIPIDEMIA, UNSPECIFIED HYPERLIPIDEMIA TYPE: ICD-10-CM

## 2018-12-20 DIAGNOSIS — E66.9 OBESITY, CLASS I, BMI 30-34.9: ICD-10-CM

## 2018-12-20 DIAGNOSIS — G47.33 OSA (OBSTRUCTIVE SLEEP APNEA): ICD-10-CM

## 2018-12-20 DIAGNOSIS — Z98.84 S/P LAPAROSCOPIC SLEEVE GASTRECTOMY: Primary | ICD-10-CM

## 2018-12-20 LAB
25(OH)D3+25(OH)D2 SERPL-MCNC: 46 NG/ML
ALBUMIN SERPL BCP-MCNC: 3.8 G/DL
ALP SERPL-CCNC: 34 U/L
ALT SERPL W/O P-5'-P-CCNC: 16 U/L
ANION GAP SERPL CALC-SCNC: 5 MMOL/L
AST SERPL-CCNC: 16 U/L
BASOPHILS # BLD AUTO: 0.04 K/UL
BASOPHILS NFR BLD: 0.6 %
BILIRUB SERPL-MCNC: 0.5 MG/DL
BUN SERPL-MCNC: 14 MG/DL
CALCIUM SERPL-MCNC: 9.8 MG/DL
CHLORIDE SERPL-SCNC: 107 MMOL/L
CHOLEST SERPL-MCNC: 211 MG/DL
CHOLEST/HDLC SERPL: 3.1 {RATIO}
CO2 SERPL-SCNC: 27 MMOL/L
CREAT SERPL-MCNC: 0.8 MG/DL
DIFFERENTIAL METHOD: ABNORMAL
EOSINOPHIL # BLD AUTO: 0.2 K/UL
EOSINOPHIL NFR BLD: 2.4 %
ERYTHROCYTE [DISTWIDTH] IN BLOOD BY AUTOMATED COUNT: 13.9 %
EST. GFR  (AFRICAN AMERICAN): >60 ML/MIN/1.73 M^2
EST. GFR  (NON AFRICAN AMERICAN): >60 ML/MIN/1.73 M^2
GLUCOSE SERPL-MCNC: 80 MG/DL
HCT VFR BLD AUTO: 38.5 %
HDLC SERPL-MCNC: 67 MG/DL
HDLC SERPL: 31.8 %
HGB BLD-MCNC: 12.5 G/DL
IMM GRANULOCYTES # BLD AUTO: 0.02 K/UL
IMM GRANULOCYTES NFR BLD AUTO: 0.3 %
IRON SERPL-MCNC: 111 UG/DL
LDLC SERPL CALC-MCNC: 128.6 MG/DL
LYMPHOCYTES # BLD AUTO: 2.5 K/UL
LYMPHOCYTES NFR BLD: 34.6 %
MCH RBC QN AUTO: 27.7 PG
MCHC RBC AUTO-ENTMCNC: 32.5 G/DL
MCV RBC AUTO: 85 FL
MONOCYTES # BLD AUTO: 0.5 K/UL
MONOCYTES NFR BLD: 7.5 %
NEUTROPHILS # BLD AUTO: 3.9 K/UL
NEUTROPHILS NFR BLD: 54.6 %
NONHDLC SERPL-MCNC: 144 MG/DL
NRBC BLD-RTO: 0 /100 WBC
PLATELET # BLD AUTO: 286 K/UL
PMV BLD AUTO: 9.1 FL
POTASSIUM SERPL-SCNC: 4.4 MMOL/L
PROT SERPL-MCNC: 7.5 G/DL
RBC # BLD AUTO: 4.52 M/UL
SATURATED IRON: 28 %
SODIUM SERPL-SCNC: 139 MMOL/L
TOTAL IRON BINDING CAPACITY: 392 UG/DL
TRANSFERRIN SERPL-MCNC: 265 MG/DL
TRIGL SERPL-MCNC: 77 MG/DL
VIT B12 SERPL-MCNC: 1756 PG/ML
WBC # BLD AUTO: 7.17 K/UL

## 2018-12-20 PROCEDURE — 84425 ASSAY OF VITAMIN B-1: CPT

## 2018-12-20 PROCEDURE — 85025 COMPLETE CBC W/AUTO DIFF WBC: CPT

## 2018-12-20 PROCEDURE — 36415 COLL VENOUS BLD VENIPUNCTURE: CPT

## 2018-12-20 PROCEDURE — 99999 PR PBB SHADOW E&M-EST. PATIENT-LVL IV: CPT | Mod: PBBFAC,,, | Performed by: PHYSICIAN ASSISTANT

## 2018-12-20 PROCEDURE — 82607 VITAMIN B-12: CPT

## 2018-12-20 PROCEDURE — 99213 OFFICE O/P EST LOW 20 MIN: CPT | Mod: S$GLB,,, | Performed by: PHYSICIAN ASSISTANT

## 2018-12-20 PROCEDURE — 80061 LIPID PANEL: CPT

## 2018-12-20 PROCEDURE — 83540 ASSAY OF IRON: CPT

## 2018-12-20 PROCEDURE — 80053 COMPREHEN METABOLIC PANEL: CPT

## 2018-12-20 PROCEDURE — 82306 VITAMIN D 25 HYDROXY: CPT

## 2018-12-20 RX ORDER — CLINDAMYCIN PHOSPHATE 10 MG/G
GEL TOPICAL
Refills: 3 | COMMUNITY
Start: 2018-11-20

## 2018-12-20 NOTE — PATIENT INSTRUCTIONS
REBOOT DIET  High Protein Liquid Diet to Modified Liquid Diet  *Remember to always get in  grams of protein daily*    Sugar Free clear liquids allowed in unlimited amounts (H20, Crystal light, SF jello, low sodium broth, Powerade Zero, Caffeine free tea, Diet V8 splash, etc)       For 7 days you should drink 4: 25-30 gram protein shakes (must have 4 g of sugar or less) to obtain between  grams of protein.          On Day 8 you will replace 1 protein shake with 1 high protein foods.  Therefore you will be drinking 3 protein shakes with 1 high protein foods. You will do this for 1 week.     After 1 week on 3 shakes and 1 high protein foods, you will replace another shake with 1-2 high protein foods.  Therefore you will be drinking 2 protein shakes with 3 high protein foods.  You will do this until you meet your desired goal.     High Protein Foods Include:  Canned tuna or chicken (packed in water)  Lean ground turkey breast or ground round  Turkey or chicken (no skin); cooked tender and cut in small pieces  [place] chicken meatballs  Lean pork or beef (cook in crock pot until very tender; cut in small pieces  Scrambled, poached, or boiled eggs  Baked, broiled, grilled or boiled fish and seafood (not fried!)  Silken tofu, Edamame (soybeans)  Beans, hummus and lentils  Lean deli meats (turkey and chicken breast, ham, roast beef)  1% or Skim Milk, Lactaid, or Soymilk  Low-fat or fat-free cottage cheese, soft cheese, mozzarella string cheese, or ricotta   Light yogurt, Greek yogurt, SF pudding      Avoid all items below:  High fat milk (whole, 2%)  Butter, margarine, oil, mayonnaise  Sour cream, cream cheese, salad dressing  Ice Cream Cakes, cookies, pies, desserts, candy  Luncheon meats (bologna, salami, chopped ham)  Corn Granola/cereal with nuts  Shredded Coconut  Sausage, Crews Gravy Fried Foods  White and wheat Bread, Rice, Pasta Cereals (including grits, oatmeal)  Crackers, Pretzels, Chips,  Granola Corn, Popcorn, Peas  White Potatoes, Sweet potatoes Flour and corn tortillas  Sugary drinks  Carbonated drinks  Alcohol      Helpful tips to survive the holidays:  - Dont save yourself for the meal: Make sure you continue to eat high protein small meals every 3-4 hours to ensure to do not become over-hungry. Avoid temptation by not showing up to a holiday party or gathering hungry.   - Plan ahead. Bring a dish to a party if you know there may not be an appropriate option.   - Choose sugar-free drinks: Stick to water or other sugar-free beverages and make sure you are getting 6-8 cups of fluid each day (but not with meals!). Avoid alcohol, carbonated beverages, and high-fat/high-sugar beverages like hot chocolate and eggnog. Try sugar-free hot cocoa made with skim milk or water, or sugar-free spiced tea to add some holiday flair to your beverage (see sugar-free mulled cider recipe below)  - Take your time: Eat mindfully. Dont graze on food throughout the day. Sit down to enjoy your small meals. Chew slowly and thoroughly. Cut your food into small pieces before eating.  - Listen to your body: Stop eating as soon as you feel full. Do not feel pressured to try certain (or all) foods or to eat all of the food on your plate. Listen to your hunger cues.   - REMEMBER: Make your holidays about spending time with family and friends instead of focusing gatherings around food.  - Keep up your exercise routine: Make sure you continue to get regular exercise throughout the holiday season. Encourage friends and family to be active by taking a walk together after a meal, to look at holiday lights, or to window-shop.    Good Holiday Meal Options:  - Roasted Turkey, NO skin. Use low sodium broth instead of gravy.   - Stuffed Bell Peppers made WITHOUT bread crumbs or Rice. Try using parmesan cheese instead  - Gumbo, NO rice. Try picking out mostly the meat/seafood and vegetables with little broth.   - Green Bean Casserole  made with 98% fat free cream of mushroom soup and crushed almonds/pecans instead of fried onions  - Side salad w/ low fat dressing. Try a different kind of salad maybe use Kale or spinach.   - Roasted non-starchy vegetables like brussel sprouts, broccoli, green beans, zucchini, butternut squash, cauliflower  - Cauliflower Mash (steam or roast cauliflower, puree w/ low fat cheese, dash of fat free milk and 2-3 sprays of I cant believe its not butter spray. Add garlic powder and black pepper to season). Use Low sodium broth instead of gravy.   - Try Loaded Cauliflower Mash (Make cauliflower like above cauliflower mash. Top with diced turkey ho, ¼ cup low fat cheddar cheese and bake @ 350* F for 5-10 minutes, until cheese is melted. Top with minced chives, black pepper and garlic to taste).   - Homemade cranberry sauce using Splenda or another alternative sweetener. Boil fresh cranberries and add splenda to taste. Boil until cranberries break open and then simmer until it reaches the consistency you want (less time for more watery sauce and simmer for longer to create a thicker sauce).   - Deviled eggs: make using low fat barillas, mustard, DILL relish (not sweet relish).   - Vegetable tray w/ Greek yogurt Ranch Dip. Mix 1 packet of hidden [place] ranch dip mix w/ 16 oz low fat plain greek yogurt.     Good Holiday Dessert Options:  - High protein Pumpkin Cheesecake (see recipe below)  - Pumpkin Whip (see recipe below)  - Quest Apple Pie or Cinnamon Roll flavored protein bar (warm in microwave for 10-15 seconds)  - Eggnog Protein shake (see recipe below)  - Fresh fruit w/ low fat cheese  - Sugar-free Jello Parfaits. Layer Red and Green sugar-free jello in cups and top w/ 2 tbsp Sugar-free cool-whip    Pumpkin Cheesecake    8 ounces fat free cream cheese, softened   2 scoops of vanilla protein powder (<4 g sugar per serving)   ¼ tsp Fine salt   2 eggs, at room temperature   1/3 cup fat free sour cream  1/3 cup fat free  half and half  1 15 -ounce can pure pumpkin puree   1 tablespoon pumpkin pie spice, plus more for dusting   Unsalted nuts, crushed  *Add splenda to taste    Directions     1. Preheat the oven to 300 degrees F. Line 18 muffin cups with paper liners. Sprinkle 1 tsp crushed unsalted nuts at the bottom of each of muffin cup liner.     2. In a large bowl, beat the cream cheese, vanilla protein powder and 1/4 teaspoon fine salt on medium-high speed until smooth and creamy, 2 to 3 minutes. Scrape down the sides, reduce speed to low and beat in the eggs, 1 at a time, until combined. Beat in 1/3 cup fat free sour cream and fat free half and half. Stir in the pumpkin puree and pumpkin pie spice until smooth. Divide evenly among cookie-lined paper cups, filling almost all the way to the top.     3. Bake until the filling is just set, 40 to 45 minutes. A sharp knife inserted into the center will come out moist, but clean. Cool completely in tins on a wire rack. Refrigerate until cold, 4 hours, or overnight. Top with a dusting of pumpkin pie spice.    Recipe altered from the following recipe: http://www.[employer].com/recipes/food-network-larry/mini-pumpkin-cheesecakes-recipe.print.html?oc=linkback    Pumpkin Whip    Box of sugar-free vanilla pudding  Can of pumpkin puree  Pumpkin Pie spice (sprinkle to taste)  ½ cup of sugar-free Cool Whip    Directions:  Make sugar-free pudding according to package directions using fat free or 1% milk. Stir in pumpkin and cool whip. Add pumpkin pie spice to taste.     Egg Nog Protein shake    8 oz skim or 1% milk  1 scoop vanilla protein powder  1 tbsp sugar-free vanilla pudding mix  ½ tsp butter flavor extract  ½ tsp rum extract  ½ tsp cinnamon     Shake together or blend with ice and serve.     Sugar-Free Mulled Cider    3 oz diet cran-apple juice  6 oz water  1 packet sugar-free apple cider mix  ½ tsp apple pie spice  ½ tsp butter flavor extract  1 tbsp Sugar-free Syrup    Mix together.  Warm if needed and serve w/ orange wedge and cinnamon stick.     Six-meals-a-day diet    MENU # 1  Breakfast: protein drink, boiled egg  Mid-morning Snack: low-sugar protein bar, apple    Lunch: turkey sandwich on whole wheat sandwich thins, baby carrots  Mid-Day Snack: ¼ cup unsalted nuts, fruit  Supper: 1 broiled or grilled chicken or pork loin, 1 cup steamed veggies.  Evening Snack: 1 cup grapes with 4 cubes cheddar cheese   Drink lots of water throughout the day and exercise!  _______________________________________________________  MENU # 2  Breakfast: 2 eggs, 2 slices turkey ho  Mid-morning snack : ¼ cup unsalted nuts, piece of fruit  Lunch: 1 cup tuna or chicken salad made with light barillas, 1 cup steamed vegetables or large salad.   Mid-day snack: 45-60 calorie light yogurt with ¼ cup chopped unsalted nuts  Supper: hamburger or turkey burger warren, slice of cheese, 1 cup steamed veggies or salad with spray dressing.   Evening Snack: protein drink  Drink lots of water and exercise!  _______________________________________________________  Menu #3  Breakfast: Greek yogurt, with Splenda and fruit (½ banana, ½ cup berries, or ¼ cup dried fruit).  Mid- morning snack: low-sugar protein bar  Lunch: Grilled chicken breast w/ slice pepper romulo cheese, 1 cup steamed veggies and salad with Salad Spritzer.    Mid-Day snack: protein drink, Raw veggies dipped in 2 tbsp light ranch or hummus  Supper: Grilled shrimp kabob with veggies such as mushrooms, onion, bellpepper, yellow squash, zucchini, cherry tomatoes. Grilled asparagus with fresh lemon.  Evening Snack: fudgsicle no-sugar-added  Drink lots of water and exercise!    Fruits and Vegetables       Include 1-2 servings of fruit daily.      1 serving of fruit includes ½ cup unsweetened applesauce, ½ medium banana, tennis ball size piece of fruit, 17 grapes, 1 cup melon, 1 cup strawberries, ¼ cup dried fruit     Include 2-3 servings of vegetables daily. 1 serving is  1 cup raw or ½ cup cooked.     Non-starchy vegetables include artichoke, asparagus, baby corn, bamboo shoots, beans: green/Italian/wax, bean sprouts, beets, broccoli, Standish sprouts, cabbage, carrots, cauliflower, celery, cucumber, eggplant, green onions or scallions, greens, jicama, leeks, mushrooms, okra, onions, pea pods, peppers, radishes, spinach, summer squash, tomatoes and salsa, turnips, vegetable juice cocktail, water chestnuts, zucchini

## 2018-12-20 NOTE — PROGRESS NOTES
BARIATRIC POST-OPERATIVE FOLLOW UP:    HISTORY OF PRESENT ILLNESS: Joaquín Bruce is a 48 y.o. female with a Body mass index is 31.29 kg/m². who presents for  a 3 year follow up s/p Lap Sleeve with Dr. Ty on 12/14/2015.  She is doing well and tolerating the diet without difficulty. Pt states that she is striving to reach her goal weight of 160.     Tummy tuck last year with Dr Rowan's last year. Went well, very happy with results.     She has lost 54 lbs, approximately 52% of her excess weight.      Denies: nausea, vomiting, abdominal pain, changes in bowel movement pattern, fever, chills, dysphagia, chest pain, and shortness of breath.    Review of Systems   Constitutional: Negative for chills, fever and malaise/fatigue.   HENT: Negative for tinnitus.    Respiratory: Negative for cough, hemoptysis and shortness of breath.    Cardiovascular: Negative for chest pain, palpitations and leg swelling.   Gastrointestinal: Negative for abdominal pain, blood in stool, constipation, diarrhea, heartburn, melena, nausea and vomiting.   Genitourinary: Negative for dysuria and hematuria.   Musculoskeletal: Negative for back pain and joint pain.   Skin: Negative for rash.   Neurological: Negative for dizziness, tingling, weakness and headaches.   Psychiatric/Behavioral: Negative for depression.       EXERCISE & VITAMINS:  See Bariatric Assessment    MEDICATIONS/ALLERGIES:  Have been reviewed.    DIET:  Regular Bariatric Diet.  Recall: ~70-75+ grams daily protein.    BF: protein and coffee  Snack: banana  Lunch: protein and something else (black eyed peas)  Or navy beans or salad  Snack: sugar free jello OR PB3  Dinner: varies, protein with mild carb     ASSESSMENT:  - Body mass index is 31.29 kg/m². s/p sleeve gastrectomy on 12/14/2015.  - Co-morbidities: HTN (resolved, off medication), HLD (stable), ETIENNE (stable)  - Good Weight loss, 54 lbs, 52% EWL  - Good Exercise regimen  - Good Vitamin Regimen  - Good  Diet    PLAN:  - Reboot diet ( patient request for after maría)   - Discussed the importance of starting regular exercise  - Information about support groups  - Continue daily vitamins and medications.  - Miralax daily for constipation, no fiber.  - RTC in 6 month or sooner if needed.  - Call the office for any issues.  - Check labs at annual visit.    15 minute visit, over 50% of time spent counseling patient face to face on diet, exercise, and weight loss.

## 2018-12-26 LAB — VIT B1 BLD-MCNC: 62 UG/L (ref 38–122)

## 2019-01-29 DIAGNOSIS — Z00.00 ROUTINE GENERAL MEDICAL EXAMINATION AT A HEALTH CARE FACILITY: Primary | ICD-10-CM

## 2019-02-22 ENCOUNTER — OFFICE VISIT (OUTPATIENT)
Dept: PULMONOLOGY | Facility: CLINIC | Age: 49
End: 2019-02-22
Payer: COMMERCIAL

## 2019-02-22 ENCOUNTER — CLINICAL SUPPORT (OUTPATIENT)
Dept: INTERNAL MEDICINE | Facility: CLINIC | Age: 49
End: 2019-02-22
Payer: COMMERCIAL

## 2019-02-22 ENCOUNTER — HOSPITAL ENCOUNTER (OUTPATIENT)
Dept: RADIOLOGY | Facility: HOSPITAL | Age: 49
Discharge: HOME | End: 2019-02-22
Attending: INTERNAL MEDICINE
Payer: COMMERCIAL

## 2019-02-22 ENCOUNTER — HOSPITAL ENCOUNTER (OUTPATIENT)
Dept: CARDIOLOGY | Facility: CLINIC | Age: 49
Discharge: HOME | End: 2019-02-22
Payer: COMMERCIAL

## 2019-02-22 VITALS
RESPIRATION RATE: 12 BRPM | HEART RATE: 77 BPM | BODY MASS INDEX: 30.99 KG/M2 | HEIGHT: 65 IN | WEIGHT: 186 LBS | SYSTOLIC BLOOD PRESSURE: 122 MMHG | DIASTOLIC BLOOD PRESSURE: 80 MMHG

## 2019-02-22 DIAGNOSIS — Z00.00 ANNUAL PHYSICAL EXAM: Primary | ICD-10-CM

## 2019-02-22 DIAGNOSIS — Z00.00 ROUTINE GENERAL MEDICAL EXAMINATION AT A HEALTH CARE FACILITY: ICD-10-CM

## 2019-02-22 DIAGNOSIS — Z00.00 ROUTINE GENERAL MEDICAL EXAMINATION AT A HEALTH CARE FACILITY: Primary | ICD-10-CM

## 2019-02-22 LAB
ALBUMIN SERPL BCP-MCNC: 3.9 G/DL
ALP SERPL-CCNC: 33 U/L
ALT SERPL W/O P-5'-P-CCNC: 15 U/L
ANION GAP SERPL CALC-SCNC: 9 MMOL/L
AST SERPL-CCNC: 18 U/L
BILIRUB SERPL-MCNC: 0.4 MG/DL
BUN SERPL-MCNC: 20 MG/DL
CALCIUM SERPL-MCNC: 10.7 MG/DL
CHLORIDE SERPL-SCNC: 106 MMOL/L
CHOLEST SERPL-MCNC: 206 MG/DL
CHOLEST/HDLC SERPL: 3.3 {RATIO}
CO2 SERPL-SCNC: 26 MMOL/L
CREAT SERPL-MCNC: 0.8 MG/DL
ERYTHROCYTE [DISTWIDTH] IN BLOOD BY AUTOMATED COUNT: 14.6 %
EST. GFR  (AFRICAN AMERICAN): >60 ML/MIN/1.73 M^2
EST. GFR  (NON AFRICAN AMERICAN): >60 ML/MIN/1.73 M^2
GLUCOSE SERPL-MCNC: 80 MG/DL
HCT VFR BLD AUTO: 40 %
HDLC SERPL-MCNC: 62 MG/DL
HDLC SERPL: 30.1 %
HGB BLD-MCNC: 12.5 G/DL
LDLC SERPL CALC-MCNC: 130.2 MG/DL
MCH RBC QN AUTO: 27.8 PG
MCHC RBC AUTO-ENTMCNC: 31.3 G/DL
MCV RBC AUTO: 89 FL
NONHDLC SERPL-MCNC: 144 MG/DL
PLATELET # BLD AUTO: 282 K/UL
PMV BLD AUTO: 9.5 FL
POTASSIUM SERPL-SCNC: 4.7 MMOL/L
PROT SERPL-MCNC: 7.5 G/DL
RBC # BLD AUTO: 4.49 M/UL
SODIUM SERPL-SCNC: 141 MMOL/L
TRIGL SERPL-MCNC: 69 MG/DL
WBC # BLD AUTO: 6.92 K/UL

## 2019-02-22 PROCEDURE — 80053 COMPREHEN METABOLIC PANEL: CPT

## 2019-02-22 PROCEDURE — 99999 PR PBB SHADOW E&M-EST. PATIENT-LVL III: ICD-10-PCS | Mod: PBBFAC,,, | Performed by: INTERNAL MEDICINE

## 2019-02-22 PROCEDURE — 77063 BREAST TOMOSYNTHESIS BI: CPT | Mod: 26,,, | Performed by: RADIOLOGY

## 2019-02-22 PROCEDURE — 99999 PR PBB SHADOW E&M-EST. PATIENT-LVL III: CPT | Mod: PBBFAC,,, | Performed by: INTERNAL MEDICINE

## 2019-02-22 PROCEDURE — 99396 PR PREVENTIVE VISIT,EST,40-64: ICD-10-PCS | Mod: S$GLB,,, | Performed by: INTERNAL MEDICINE

## 2019-02-22 PROCEDURE — 77063 MAMMO DIGITAL SCREENING BILAT WITH TOMOSYNTHESIS_CAD: ICD-10-PCS | Mod: 26,,, | Performed by: RADIOLOGY

## 2019-02-22 PROCEDURE — 77067 SCR MAMMO BI INCL CAD: CPT | Mod: TC

## 2019-02-22 PROCEDURE — 93000 EKG 12-LEAD: ICD-10-PCS | Mod: S$GLB,,, | Performed by: INTERNAL MEDICINE

## 2019-02-22 PROCEDURE — 80061 LIPID PANEL: CPT

## 2019-02-22 PROCEDURE — 99396 PREV VISIT EST AGE 40-64: CPT | Mod: S$GLB,,, | Performed by: INTERNAL MEDICINE

## 2019-02-22 PROCEDURE — 77067 SCR MAMMO BI INCL CAD: CPT | Mod: 26,,, | Performed by: RADIOLOGY

## 2019-02-22 PROCEDURE — 77067 MAMMO DIGITAL SCREENING BILAT WITH TOMOSYNTHESIS_CAD: ICD-10-PCS | Mod: 26,,, | Performed by: RADIOLOGY

## 2019-02-22 PROCEDURE — 85027 COMPLETE CBC AUTOMATED: CPT

## 2019-02-22 PROCEDURE — 93000 ELECTROCARDIOGRAM COMPLETE: CPT | Mod: S$GLB,,, | Performed by: INTERNAL MEDICINE

## 2019-02-22 PROCEDURE — 36415 COLL VENOUS BLD VENIPUNCTURE: CPT

## 2019-02-22 NOTE — PROGRESS NOTES
Subjective:       Patient ID: Joaquín Bruce is a 48 y.o. female.    Chief Complaint: Annual Exam    HPI  47 yo  who works at [employer] comes for her periodic health exam. She feels well, had a gastric sleeve procedure  In 2012, and has done well, has kept up weight off, would like to lose another 10 pounds. Originally from [place], likes [place] and going to [place] for [place] with her sisters.  Review of Systems   Constitutional: Negative.    HENT: Negative.    Eyes: Negative.    Respiratory: Negative.    Cardiovascular: Negative.    Gastrointestinal: Negative.         S/P Gastric Sleeve   Genitourinary: Negative.    Musculoskeletal: Negative.    Skin: Negative.    Neurological: Negative.    Psychiatric/Behavioral: Negative.    All other systems reviewed and are negative.      Objective:      Physical Exam   Constitutional: She is oriented to person, place, and time. She appears well-developed and well-nourished. No distress.   HENT:   Head: Normocephalic and atraumatic.   Right Ear: External ear normal.   Left Ear: External ear normal.   Nose: Nose normal.   Mouth/Throat: Oropharynx is clear and moist.   Eyes: Conjunctivae and EOM are normal. Pupils are equal, round, and reactive to light.   Neck: Normal range of motion. Neck supple. No JVD present. No thyromegaly present.   Cardiovascular: Normal rate, regular rhythm, normal heart sounds and intact distal pulses. Exam reveals no gallop.   No murmur heard.  Pulmonary/Chest: Breath sounds normal. No stridor. No respiratory distress. She has no wheezes. She has no rales. She exhibits no tenderness.   Peak flow 400 l/min   Abdominal: Soft. Bowel sounds are normal. She exhibits no distension and no mass. There is no tenderness. There is no rebound and no guarding.   Musculoskeletal: Normal range of motion. She exhibits no edema.   Lymphadenopathy:     She has no cervical adenopathy.   Neurological: She is alert and oriented to person, place,  and time. She has normal reflexes. She displays normal reflexes. No cranial nerve deficit.   Skin: Skin is warm and dry. No rash noted.   Psychiatric: She has a normal mood and affect. Her behavior is normal. Judgment and thought content normal.   Nursing note and vitals reviewed.      Assessment:       1. Annual physical exam        Plan:       Labs: Calcium slightly elevated, 10.7  & cholesterol: 206 with a normal LDL, all other parameters are normal. EKG is normal and Mammogram is negative for malignancy.

## 2019-02-22 NOTE — LETTER
February 22, 2019    Joaquín Bruce  [address]  [place] [address]             [facility] - Pulmonary Services  [address]  [place] [address]  Phone: [phone] Dear Mrs. Bruce:    Thank you for allowing me to serve you and perform your Executive Health exam on 2/22/2019. This letter will serve as a brief summary of the physical findings and laboratory/studies performed and recommendations at this time. Except for a mild elevation of the cholesterol and calcium, this is a normal exam. The calcium value may be a lab aberration. Contact in a month,so I can repeat that blood test.   [place].        If you have any questions or concerns, please don't hesitate to call.    Sincerely,        Papa Dela Cruz MD

## 2019-08-02 ENCOUNTER — OFFICE VISIT (OUTPATIENT)
Dept: OBSTETRICS AND GYNECOLOGY | Facility: CLINIC | Age: 49
End: 2019-08-02
Payer: COMMERCIAL

## 2019-08-02 VITALS
DIASTOLIC BLOOD PRESSURE: 74 MMHG | WEIGHT: 198.31 LBS | HEIGHT: 65 IN | BODY MASS INDEX: 33.04 KG/M2 | SYSTOLIC BLOOD PRESSURE: 111 MMHG

## 2019-08-02 DIAGNOSIS — Z01.419 ENCOUNTER FOR GYNECOLOGICAL EXAMINATION WITHOUT ABNORMAL FINDING: Primary | ICD-10-CM

## 2019-08-02 PROCEDURE — 99396 PR PREVENTIVE VISIT,EST,40-64: ICD-10-PCS | Mod: S$GLB,,, | Performed by: OBSTETRICS & GYNECOLOGY

## 2019-08-02 PROCEDURE — 88175 CYTOPATH C/V AUTO FLUID REDO: CPT

## 2019-08-02 PROCEDURE — 99999 PR PBB SHADOW E&M-EST. PATIENT-LVL III: ICD-10-PCS | Mod: PBBFAC,,, | Performed by: OBSTETRICS & GYNECOLOGY

## 2019-08-02 PROCEDURE — 99396 PREV VISIT EST AGE 40-64: CPT | Mod: S$GLB,,, | Performed by: OBSTETRICS & GYNECOLOGY

## 2019-08-02 PROCEDURE — 99999 PR PBB SHADOW E&M-EST. PATIENT-LVL III: CPT | Mod: PBBFAC,,, | Performed by: OBSTETRICS & GYNECOLOGY

## 2019-08-02 RX ORDER — DESOXIMETASONE 2.5 MG/G
CREAM TOPICAL
Refills: 3 | COMMUNITY
Start: 2019-06-14 | End: 2022-06-30

## 2019-08-02 RX ORDER — KETOCONAZOLE 20 MG/G
CREAM TOPICAL
Refills: 2 | COMMUNITY
Start: 2019-06-14 | End: 2022-06-30

## 2019-08-02 NOTE — PROGRESS NOTES
"CC: Well woman exam    Joaquín Bruce is a 48 y.o. female  presents for a well woman exam.  She has no issues, problems, or complaints.  She has normal consistent cycles.       Past Medical History:   Diagnosis Date    Genital herpes     Hyperlipidemia     Hypertension     Morbid obesity with BMI of 40.0-44.9, adult     ETIENNE (obstructive sleep apnea)        Past Surgical History:   Procedure Laterality Date    abdominal plasty  2017    APPENDECTOMY      open, took ovary and fallopian tubes as well    COLONOSCOPY - Miralax split prep N/A 3/28/2017    Performed by [facility]aja [facility] at [facility]O    GASTRECTOMY      GASTRECTOMY-SLEEVE-LAPAROSCOPIC - 76503 W/ Intraop egd & hiatal hernis repair N/A 2015    Performed [facility]ese Ty [facility] at [facility] OR 2ND FLR    HERNIA REPAIR      THIGH LIFT         OB History    Para Term  AB Living   2 1 1   1     SAB TAB Ectopic Multiple Live Births     1            # Outcome Date GA Lbr Jerson/2nd Weight Sex Delivery Anes PTL Lv   2 Term            1 TAB                Family History   Problem Relation Age of Onset    Hypertension Father     Heart disease Father     Cancer Mother         breast, in remission x 5 years    Breast cancer Mother     No Known Problems Sister     Stroke Maternal Grandmother     Obesity Sister     Obesity Sister     Colon cancer Maternal Uncle     Ovarian cancer Maternal Aunt         maternal grandmother's sister dx age unkown       Social History     Tobacco Use    Smoking status: Never Smoker    Smokeless tobacco: Never Used   Substance Use Topics    Alcohol use: Yes     Comment: OCC    Drug use: No       /74   Ht 5' 5" (1.651 m)   Wt 89.9 kg (198 lb 4.9 oz)   LMP 2019   BMI 33.00 kg/m²     ROS:  GENERAL: Denies weight gain or weight loss. Feeling well overall.   SKIN: Denies rash or lesions.   HEAD: Denies head injury or headache.   NODES: Denies enlarged lymph nodes. "   CHEST: Denies chest pain or shortness of breath.   CARDIOVASCULAR: Denies palpitations or left sided chest pain.   ABDOMEN: No abdominal pain, constipation, diarrhea, nausea, vomiting or rectal bleeding.   URINARY: No frequency, dysuria, hematuria, or burning on urination.  REPRODUCTIVE: See HPI.   BREASTS: The patient performs breast self-examination and denies pain, lumps, or nipple discharge.   HEMATOLOGIC: No easy bruisability or excessive bleeding.  MUSCULOSKELETAL: Denies joint pain or swelling.   NEUROLOGIC: Denies syncope or weakness.   PSYCHIATRIC: Denies depression, anxiety or mood swings.    Physical Exam:    APPEARANCE: Well nourished, well developed, in no acute distress.  AFFECT: WNL, alert and oriented x 3  SKIN: No acne or hirsutism  NECK: Neck symmetric without masses or thyromegaly  NODES: No inguinal, cervical, axillary, or femoral lymph node enlargement  CHEST: Good respiratory effect  ABDOMEN: Soft.  No tenderness or masses.  No hepatosplenomegaly.  No hernias.  BREASTS: Symmetrical, no skin changes or visible lesions.  No palpable masses, nipple discharge bilaterally.  PELVIC: Normal external genitalia without lesions.  Normal hair distribution.  Adequate perineal body, normal urethral meatus.  Vagina moist and well rugated without lesions or discharge.  Cervix pink, without lesions, discharge or tenderness.  No significant cystocele or rectocele.  Bimanual exam shows uterus to be normal size, regular, mobile and nontender.  Adnexa without masses or tenderness.    EXTREMITIES: No edema.    ASSESSMENT AND PLAN  1. Encounter for gynecological examination without abnormal finding  Liquid-based pap smear, screening     MMG up to date    Patient was counseled today on A.C.S. Pap guidelines and recommendations for yearly pelvic exams, mammograms and monthly self breast exams; to see her PCP for other health maintenance.     Follow up in about 1 year (around 8/2/2020).

## 2019-08-23 ENCOUNTER — TELEPHONE (OUTPATIENT)
Dept: OBSTETRICS AND GYNECOLOGY | Facility: CLINIC | Age: 49
End: 2019-08-23

## 2019-08-23 NOTE — TELEPHONE ENCOUNTER
Normal pap but you may have a bacterial infection present . Please schedule appt to check for trichomonas.  Thanks-   ARIANNE Curtis

## 2019-08-30 ENCOUNTER — TELEPHONE (OUTPATIENT)
Dept: INTERNAL MEDICINE | Facility: CLINIC | Age: 49
End: 2019-08-30

## 2019-08-30 NOTE — TELEPHONE ENCOUNTER
Spoke to patient regarding appointment. She missed her appointment today, we rescheduled appointment for further testing.

## 2019-09-03 ENCOUNTER — OFFICE VISIT (OUTPATIENT)
Dept: OBSTETRICS AND GYNECOLOGY | Facility: CLINIC | Age: 49
End: 2019-09-03
Payer: COMMERCIAL

## 2019-09-03 ENCOUNTER — LAB VISIT (OUTPATIENT)
Dept: LAB | Facility: HOSPITAL | Age: 49
End: 2019-09-03
Attending: OBSTETRICS & GYNECOLOGY
Payer: COMMERCIAL

## 2019-09-03 VITALS
DIASTOLIC BLOOD PRESSURE: 78 MMHG | WEIGHT: 198.88 LBS | BODY MASS INDEX: 33.13 KG/M2 | HEIGHT: 65 IN | SYSTOLIC BLOOD PRESSURE: 126 MMHG

## 2019-09-03 DIAGNOSIS — Z20.2 ENCOUNTER FOR ASSESSMENT OF STD EXPOSURE: Primary | ICD-10-CM

## 2019-09-03 DIAGNOSIS — Z20.2 ENCOUNTER FOR ASSESSMENT OF STD EXPOSURE: ICD-10-CM

## 2019-09-03 PROCEDURE — 86703 HIV-1/HIV-2 1 RESULT ANTBDY: CPT

## 2019-09-03 PROCEDURE — 99214 PR OFFICE/OUTPT VISIT, EST, LEVL IV, 30-39 MIN: ICD-10-PCS | Mod: S$GLB,,, | Performed by: OBSTETRICS & GYNECOLOGY

## 2019-09-03 PROCEDURE — 99214 OFFICE O/P EST MOD 30 MIN: CPT | Mod: S$GLB,,, | Performed by: OBSTETRICS & GYNECOLOGY

## 2019-09-03 PROCEDURE — 87801 DETECT AGNT MULT DNA AMPLI: CPT

## 2019-09-03 PROCEDURE — 80074 ACUTE HEPATITIS PANEL: CPT

## 2019-09-03 PROCEDURE — 36415 COLL VENOUS BLD VENIPUNCTURE: CPT

## 2019-09-03 PROCEDURE — 87661 TRICHOMONAS VAGINALIS AMPLIF: CPT

## 2019-09-03 PROCEDURE — 99999 PR PBB SHADOW E&M-EST. PATIENT-LVL III: ICD-10-PCS | Mod: PBBFAC,,, | Performed by: OBSTETRICS & GYNECOLOGY

## 2019-09-03 PROCEDURE — 87481 CANDIDA DNA AMP PROBE: CPT | Mod: 59

## 2019-09-03 PROCEDURE — 99999 PR PBB SHADOW E&M-EST. PATIENT-LVL III: CPT | Mod: PBBFAC,,, | Performed by: OBSTETRICS & GYNECOLOGY

## 2019-09-03 PROCEDURE — 86592 SYPHILIS TEST NON-TREP QUAL: CPT

## 2019-09-03 NOTE — PROGRESS NOTES
"GYNECOLOGY  :  Joaquín Bruce is a 49 y.o.    Here today for  gyn evaluation   Seen Dr Curtis @ [facility]  For annual in august this year   PAP resulted negative for malignancy but positive for trichomonads   reports some vaginal discharge, but no itching or other gyn symptoms   Here to be tested for STD's         Past Medical History:   Diagnosis Date    Genital herpes     Hyperlipidemia     Hypertension     Morbid obesity with BMI of 40.0-44.9, adult     ETIENNE (obstructive sleep apnea)      Past Surgical History:   Procedure Laterality Date    abdominal plasty  2017    APPENDECTOMY      open, took ovary and fallopian tubes as well    COLONOSCOPY - Miralax split prep N/A 3/28/2017    [facility]    GASTRECTOMY      GASTRECTOMY-SLEEVE-LAPAROSCOPIC - [address]/ Intraop egd & hiatal hernis repair N/A 2015    [facility] 2ND FLR    HERNIA REPAIR      THIGH LIFT       Family History   Problem Relation Age of Onset    Hypertension Father     Heart disease Father     Cancer Mother         breast, in remission x 5 years    Breast cancer Mother     No Known Problems Sister     Stroke Maternal Grandmother     Obesity Sister     Obesity Sister     Colon cancer Maternal Uncle     Ovarian cancer Maternal Aunt         maternal grandmother's sister dx age unkown     Social History     Tobacco Use    Smoking status: Never Smoker    Smokeless tobacco: Never Used   Substance Use Topics    Alcohol use: Yes     Comment: OCC    Drug use: No     OB History    Para Term  AB Living   2 1 1   1     SAB TAB Ectopic Multiple Live Births     1            # Outcome Date GA Lbr Jerson/2nd Weight Sex Delivery Anes PTL Lv   2 Term            1 TAB                /78   Ht 5' 5" (1.651 m)   Wt 90.2 kg (198 lb 13.7 oz)   LMP 2019   BMI 33.09 kg/m²     ion:      ROS  GENERAL: Denies significant " weight gain or weight loss. Feeling well overall.  SKIN: Denies rash or lesions.  Normal skin turgor  HEAD: Denies head injury or headache.   NODES: Denies enlarged lymph nodes.   CHEST: Denies chest pain or shortness of breath.   CARDIOVASCULAR: Denies palpitations or left sided chest pain.   ABDOMEN: No abdominal pain,no  diarrhea,constipation  nausea, vomiting or rectal bleeding.   URINARY: normal  Frequency,no  Dysuria or burning on urination.   REPRODUCTIVE: Per HPI   BREASTS: The patient sometimes performs breast self-examination and denies pain, lumps, or nipple discharge.   HEMATOLOGIC: No easy bruisability or excessive bleeding.   MUSCULOSKELETAL: Denies joint pain or swelling.   NEUROLOGIC: Denies syncope or weakness.   PSYCHIATRIC: Denies depression, anxiety or mood swings.    Physical Exam     Constitutional: She is oriented to person, place, and time. She appears well-developed and well-nourished. No distress.   HENT:   Head: Normocephalic.   NECK: Neck symmetric without masses or thyromegaly.  Cardiovascular: Normal rate and regular rhythm.   Pulmonary/Chest: Effort normal and breath sounds normal. No respiratory distress. She has no wheezes.   Breasts: Symmetrical, no skin changes or visible lesions. No palpable masses, nipple discharge or adenopathy bilaterally.  Abdominal: Soft not distended. Bowel sounds are normal. She exhibits   no masses . No tenderness to palpation.   Genitourinary: Pelvic exam was performed with patient supine.   External genitalia normal no lesions.Normal hair distribution   Adequate perineal body,normal urethral meatus   Vagina moist and well rugated without lesions, white  vaginal  Discharge.   Cervix pink and without lesions. No bleeding. No significant cystocele or rectocele.  Bimanual exam showed uterus normal size, shape and position , mobile and nontender. Adnexa without masses or tenderness. Urethra and bladder normal  Extremities normal no cyanosis ,edema.      Procedures:  AFFIRM   GC/CT   HIV/RPR /Hep B       A/P Joaquín Bruce 49 y.o.     Dx=  1- STD screening   2-  3-    Plan:    WIll review  labs and cultures   :    Patient was counseled today on A.C.S. Pap guidelines and recommendations for yearly pelvic exams, mammograms and monthly self breast exams; to see her PCP for other health maintenance, nutrition and weight gain counseling, discussed lab values.  Discussed colonoscopy recommendations every 10 years starting at age 50   Calcium and vit D daily intake     F/u in 1 yr or AUSTIN Cota M.D.   OB/GYN

## 2019-09-04 LAB
HAV IGM SERPL QL IA: NEGATIVE
HBV CORE IGM SERPL QL IA: NEGATIVE
HBV SURFACE AG SERPL QL IA: NEGATIVE
HCV AB SERPL QL IA: NEGATIVE
HIV 1+2 AB+HIV1 P24 AG SERPL QL IA: NEGATIVE
RPR SER QL: NORMAL

## 2019-09-05 LAB
BACTERIAL VAGINOSIS DNA: NEGATIVE
CANDIDA GLABRATA DNA: NEGATIVE
CANDIDA KRUSEI DNA: NEGATIVE
CANDIDA RRNA VAG QL PROBE: NEGATIVE
T VAGINALIS RRNA GENITAL QL PROBE: POSITIVE

## 2019-09-06 ENCOUNTER — TELEPHONE (OUTPATIENT)
Dept: OBSTETRICS AND GYNECOLOGY | Facility: CLINIC | Age: 49
End: 2019-09-06

## 2019-09-06 RX ORDER — METRONIDAZOLE 500 MG/1
2000 TABLET ORAL ONCE
Qty: 4 TABLET | Refills: 0 | Status: SHIPPED | OUTPATIENT
Start: 2019-09-06 | End: 2019-09-06

## 2019-09-06 NOTE — TELEPHONE ENCOUNTER
Patient notified and verbalized understanding.  Patient states she has not had a sexual partner in about 2 years and has not used a sex toy in about 2 months.  Advised that the bacteria could have remained on the toy if it was not disinfected properly and she remained asymptomatic.  Advised on properly disinfecting personal hygiene towels and sex toys.  Also suggested not to use the same towel more then one time.  Patient verbalized understanding.

## 2019-09-06 NOTE — TELEPHONE ENCOUNTER
AFFIRM resulted positive for trichomonads     Prescription sent to pharmacy       Please note that this is a sexually transmitted disease, patient  should abstain from sexual activity until sex partner is tested and treated ,and prevent re-infections using condoms.     Jameson Cota M.D.   OB/GYN

## 2019-09-10 ENCOUNTER — TELEPHONE (OUTPATIENT)
Dept: OBSTETRICS AND GYNECOLOGY | Facility: HOSPITAL | Age: 49
End: 2019-09-10

## 2019-09-16 ENCOUNTER — PATIENT MESSAGE (OUTPATIENT)
Dept: PULMONOLOGY | Facility: CLINIC | Age: 49
End: 2019-09-16

## 2019-09-16 DIAGNOSIS — G47.00 INSOMNIA, UNSPECIFIED TYPE: ICD-10-CM

## 2019-09-16 RX ORDER — ZOLPIDEM TARTRATE 10 MG/1
TABLET ORAL
Qty: 30 TABLET | Refills: 3 | Status: SHIPPED | OUTPATIENT
Start: 2019-09-16 | End: 2020-03-24 | Stop reason: SDUPTHER

## 2019-10-22 DIAGNOSIS — K21.9 GASTROESOPHAGEAL REFLUX DISEASE, ESOPHAGITIS PRESENCE NOT SPECIFIED: ICD-10-CM

## 2019-10-22 DIAGNOSIS — Z98.84 STATUS POST LAPAROSCOPIC SLEEVE GASTRECTOMY: ICD-10-CM

## 2019-10-22 RX ORDER — OMEPRAZOLE 40 MG/1
40 CAPSULE, DELAYED RELEASE ORAL DAILY
Qty: 90 CAPSULE | Refills: 3 | Status: SHIPPED | OUTPATIENT
Start: 2019-10-22 | End: 2019-11-06 | Stop reason: SDUPTHER

## 2019-11-06 ENCOUNTER — PATIENT MESSAGE (OUTPATIENT)
Dept: PULMONOLOGY | Facility: CLINIC | Age: 49
End: 2019-11-06

## 2019-11-06 DIAGNOSIS — K21.9 GASTROESOPHAGEAL REFLUX DISEASE, ESOPHAGITIS PRESENCE NOT SPECIFIED: ICD-10-CM

## 2019-11-06 DIAGNOSIS — Z98.84 STATUS POST LAPAROSCOPIC SLEEVE GASTRECTOMY: ICD-10-CM

## 2019-11-06 RX ORDER — OMEPRAZOLE 40 MG/1
40 CAPSULE, DELAYED RELEASE ORAL DAILY
Qty: 14 CAPSULE | Refills: 3 | Status: SHIPPED | OUTPATIENT
Start: 2019-11-06 | End: 2020-03-24 | Stop reason: SDUPTHER

## 2020-03-23 DIAGNOSIS — Z00.00 ROUTINE GENERAL MEDICAL EXAMINATION AT A HEALTH CARE FACILITY: Primary | ICD-10-CM

## 2020-03-24 DIAGNOSIS — Z98.84 STATUS POST LAPAROSCOPIC SLEEVE GASTRECTOMY: ICD-10-CM

## 2020-03-24 DIAGNOSIS — K21.9 GASTROESOPHAGEAL REFLUX DISEASE, ESOPHAGITIS PRESENCE NOT SPECIFIED: ICD-10-CM

## 2020-03-24 DIAGNOSIS — G47.00 INSOMNIA, UNSPECIFIED TYPE: ICD-10-CM

## 2020-03-24 RX ORDER — OMEPRAZOLE 40 MG/1
40 CAPSULE, DELAYED RELEASE ORAL DAILY
Qty: 90 CAPSULE | Refills: 3 | Status: SHIPPED | OUTPATIENT
Start: 2020-03-24 | End: 2021-03-09

## 2020-03-24 RX ORDER — ZOLPIDEM TARTRATE 10 MG/1
TABLET ORAL
Qty: 30 TABLET | Refills: 3 | Status: SHIPPED | OUTPATIENT
Start: 2020-03-24 | End: 2020-10-19 | Stop reason: SDUPTHER

## 2020-04-30 ENCOUNTER — PATIENT MESSAGE (OUTPATIENT)
Dept: BARIATRICS | Facility: CLINIC | Age: 50
End: 2020-04-30

## 2020-05-28 ENCOUNTER — HOSPITAL ENCOUNTER (OUTPATIENT)
Dept: RADIOLOGY | Facility: HOSPITAL | Age: 50
Discharge: HOME | End: 2020-05-28
Attending: INTERNAL MEDICINE
Payer: COMMERCIAL

## 2020-05-28 ENCOUNTER — CLINICAL SUPPORT (OUTPATIENT)
Dept: INTERNAL MEDICINE | Facility: CLINIC | Age: 50
End: 2020-05-28
Payer: COMMERCIAL

## 2020-05-28 ENCOUNTER — HOSPITAL ENCOUNTER (OUTPATIENT)
Dept: CARDIOLOGY | Facility: CLINIC | Age: 50
Discharge: HOME | End: 2020-05-28
Payer: COMMERCIAL

## 2020-05-28 ENCOUNTER — OFFICE VISIT (OUTPATIENT)
Dept: PULMONOLOGY | Facility: CLINIC | Age: 50
End: 2020-05-28
Payer: COMMERCIAL

## 2020-05-28 VITALS
BODY MASS INDEX: 33.99 KG/M2 | WEIGHT: 204 LBS | SYSTOLIC BLOOD PRESSURE: 128 MMHG | HEART RATE: 59 BPM | DIASTOLIC BLOOD PRESSURE: 80 MMHG | HEIGHT: 65 IN

## 2020-05-28 DIAGNOSIS — Z00.00 ROUTINE GENERAL MEDICAL EXAMINATION AT A HEALTH CARE FACILITY: Primary | ICD-10-CM

## 2020-05-28 DIAGNOSIS — Z00.00 ROUTINE GENERAL MEDICAL EXAMINATION AT A HEALTH CARE FACILITY: ICD-10-CM

## 2020-05-28 DIAGNOSIS — Z00.00 ANNUAL PHYSICAL EXAM: Primary | ICD-10-CM

## 2020-05-28 LAB
ALBUMIN SERPL BCP-MCNC: 3.9 G/DL (ref 3.5–5.2)
ALP SERPL-CCNC: 39 U/L (ref 55–135)
ALT SERPL W/O P-5'-P-CCNC: 22 U/L (ref 10–44)
ANION GAP SERPL CALC-SCNC: 8 MMOL/L (ref 8–16)
AST SERPL-CCNC: 17 U/L (ref 10–40)
BILIRUB SERPL-MCNC: 0.4 MG/DL (ref 0.1–1)
BUN SERPL-MCNC: 13 MG/DL (ref 6–20)
CALCIUM SERPL-MCNC: 10.2 MG/DL (ref 8.7–10.5)
CHLORIDE SERPL-SCNC: 105 MMOL/L (ref 95–110)
CHOLEST SERPL-MCNC: 205 MG/DL (ref 120–199)
CHOLEST/HDLC SERPL: 3.7 {RATIO} (ref 2–5)
CO2 SERPL-SCNC: 28 MMOL/L (ref 23–29)
CREAT SERPL-MCNC: 0.8 MG/DL (ref 0.5–1.4)
ERYTHROCYTE [DISTWIDTH] IN BLOOD BY AUTOMATED COUNT: 14.5 % (ref 11.5–14.5)
EST. GFR  (AFRICAN AMERICAN): >60 ML/MIN/1.73 M^2
EST. GFR  (NON AFRICAN AMERICAN): >60 ML/MIN/1.73 M^2
GLUCOSE SERPL-MCNC: 86 MG/DL (ref 70–110)
HCT VFR BLD AUTO: 42.8 % (ref 37–48.5)
HDLC SERPL-MCNC: 56 MG/DL (ref 40–75)
HDLC SERPL: 27.3 % (ref 20–50)
HGB BLD-MCNC: 13.1 G/DL (ref 12–16)
LDLC SERPL CALC-MCNC: 124.2 MG/DL (ref 63–159)
MCH RBC QN AUTO: 27.5 PG (ref 27–31)
MCHC RBC AUTO-ENTMCNC: 30.6 G/DL (ref 32–36)
MCV RBC AUTO: 90 FL (ref 82–98)
NONHDLC SERPL-MCNC: 149 MG/DL
PLATELET # BLD AUTO: 259 K/UL (ref 150–350)
PMV BLD AUTO: 9.6 FL (ref 9.2–12.9)
POTASSIUM SERPL-SCNC: 4.3 MMOL/L (ref 3.5–5.1)
PROT SERPL-MCNC: 7.7 G/DL (ref 6–8.4)
RBC # BLD AUTO: 4.76 M/UL (ref 4–5.4)
SODIUM SERPL-SCNC: 141 MMOL/L (ref 136–145)
TRIGL SERPL-MCNC: 124 MG/DL (ref 30–150)
WBC # BLD AUTO: 8.15 K/UL (ref 3.9–12.7)

## 2020-05-28 PROCEDURE — 99999 PR PBB SHADOW E&M-EST. PATIENT-LVL III: CPT | Mod: PBBFAC,,, | Performed by: INTERNAL MEDICINE

## 2020-05-28 PROCEDURE — 99396 PREV VISIT EST AGE 40-64: CPT | Mod: S$GLB,,, | Performed by: INTERNAL MEDICINE

## 2020-05-28 PROCEDURE — 93005 ELECTROCARDIOGRAM TRACING: CPT | Mod: S$GLB,,, | Performed by: INTERNAL MEDICINE

## 2020-05-28 PROCEDURE — 80053 COMPREHEN METABOLIC PANEL: CPT

## 2020-05-28 PROCEDURE — 36415 COLL VENOUS BLD VENIPUNCTURE: CPT

## 2020-05-28 PROCEDURE — 77067 SCR MAMMO BI INCL CAD: CPT | Mod: 26,,, | Performed by: RADIOLOGY

## 2020-05-28 PROCEDURE — 77067 SCR MAMMO BI INCL CAD: CPT | Mod: TC

## 2020-05-28 PROCEDURE — 99396 PR PREVENTIVE VISIT,EST,40-64: ICD-10-PCS | Mod: S$GLB,,, | Performed by: INTERNAL MEDICINE

## 2020-05-28 PROCEDURE — 93010 EKG 12-LEAD: ICD-10-PCS | Mod: S$GLB,,, | Performed by: INTERNAL MEDICINE

## 2020-05-28 PROCEDURE — 99999 PR PBB SHADOW E&M-EST. PATIENT-LVL III: ICD-10-PCS | Mod: PBBFAC,,, | Performed by: INTERNAL MEDICINE

## 2020-05-28 PROCEDURE — 77067 MAMMO DIGITAL SCREENING BILAT WITH TOMOSYNTHESIS_CAD: ICD-10-PCS | Mod: 26,,, | Performed by: RADIOLOGY

## 2020-05-28 PROCEDURE — 93010 ELECTROCARDIOGRAM REPORT: CPT | Mod: S$GLB,,, | Performed by: INTERNAL MEDICINE

## 2020-05-28 PROCEDURE — 85027 COMPLETE CBC AUTOMATED: CPT

## 2020-05-28 PROCEDURE — 77063 MAMMO DIGITAL SCREENING BILAT WITH TOMOSYNTHESIS_CAD: ICD-10-PCS | Mod: 26,,, | Performed by: RADIOLOGY

## 2020-05-28 PROCEDURE — 80061 LIPID PANEL: CPT

## 2020-05-28 PROCEDURE — 93005 EKG 12-LEAD: ICD-10-PCS | Mod: S$GLB,,, | Performed by: INTERNAL MEDICINE

## 2020-05-28 PROCEDURE — 77063 BREAST TOMOSYNTHESIS BI: CPT | Mod: 26,,, | Performed by: RADIOLOGY

## 2020-05-28 NOTE — LETTER
May 28, 2020    Joaquín Bruce  [address]  [place] [address]             [facility] - Pulmonary Services  [facility]  [place] [address]  Phone: [phone] Dear Ms. Bruce,        Thank you for allowing me to serve you and perform your Executive Health exam on 5/28/2020. This letter will serve as a brief summary of the physical findings and laboratory/studies performed and recommendations at this time. Except for a slight elevation of your cholesterol and weight, this is a normal exam.  Need to get some exercise.        If you have any questions or concerns, please don't hesitate to call.    Sincerely,        Papa Dela Cruz MD

## 2020-05-28 NOTE — PROGRESS NOTES
Subjective:       Patient ID: Joaquín Bruce is a 49 y.o. female.    Chief Complaint: Annual Exam    HPI  50 yo  from [employer] comes for her periodic health exam. She feels well since she has started working from home. The refrigerator is to close to her work space. She has had no significant medical encounters since her last visit. She had a gastric  Sleeve procedures 10 years ago by Dr. Dye.  Review of Systems   Constitutional: Negative.    HENT: Negative.    Eyes: Negative.    Respiratory: Negative.         ETIENNE   Cardiovascular: Negative.    Gastrointestinal: Negative.         Gastric bypass 10 years ago.  Doing well but has gained weight with working from home.    Some dyspepsia  Uses prilosec   Genitourinary: Negative.    Musculoskeletal: Negative.    Skin: Negative.    Neurological: Negative.    Psychiatric/Behavioral: Negative.    All other systems reviewed and are negative.      Objective:      Physical Exam   Constitutional: She is oriented to person, place, and time. She appears well-developed and well-nourished. No distress.   HENT:   Head: Normocephalic and atraumatic.   Right Ear: External ear normal.   Left Ear: External ear normal.   Nose: Nose normal.   Mouth/Throat: Oropharynx is clear and moist.   Eyes: Pupils are equal, round, and reactive to light. Conjunctivae and EOM are normal.   Neck: Normal range of motion. Neck supple. No JVD present. No thyromegaly present.   Cardiovascular: Normal rate, regular rhythm, normal heart sounds and intact distal pulses. Exam reveals no gallop.   No murmur heard.  Pulmonary/Chest: Breath sounds normal. No stridor. No respiratory distress. She has no wheezes. She has no rales. She exhibits no tenderness.   Abdominal: Soft. Bowel sounds are normal. She exhibits no distension and no mass. There is no tenderness. There is no rebound and no guarding.   Musculoskeletal: Normal range of motion. She exhibits no edema.   Lymphadenopathy:     She has  no cervical adenopathy.   Neurological: She is alert and oriented to person, place, and time. She has normal reflexes. She displays normal reflexes. No cranial nerve deficit.   Skin: Skin is warm and dry. No rash noted.   Psychiatric: She has a normal mood and affect. Her behavior is normal. Judgment and thought content normal.   Nursing note and vitals reviewed.      Assessment:       1. Annual physical exam        Plan:           Labs: Cholesterol: 205, all other parameters are normal.EKG is normal and Mammogram is negative for  Malignancy. IMP Healthy but moderately obese female.

## 2020-06-19 ENCOUNTER — OFFICE VISIT (OUTPATIENT)
Dept: OBSTETRICS AND GYNECOLOGY | Facility: CLINIC | Age: 50
End: 2020-06-19
Payer: COMMERCIAL

## 2020-06-19 VITALS
BODY MASS INDEX: 34.8 KG/M2 | HEIGHT: 65 IN | SYSTOLIC BLOOD PRESSURE: 120 MMHG | WEIGHT: 208.88 LBS | DIASTOLIC BLOOD PRESSURE: 80 MMHG

## 2020-06-19 DIAGNOSIS — Z86.19 HISTORY OF TRICHOMONIASIS: ICD-10-CM

## 2020-06-19 DIAGNOSIS — Z01.419 ENCOUNTER FOR GYNECOLOGICAL EXAMINATION WITHOUT ABNORMAL FINDING: Primary | ICD-10-CM

## 2020-06-19 DIAGNOSIS — B37.9 CANDIDA INFECTION: ICD-10-CM

## 2020-06-19 PROCEDURE — 99396 PR PREVENTIVE VISIT,EST,40-64: ICD-10-PCS | Mod: S$GLB,,, | Performed by: OBSTETRICS & GYNECOLOGY

## 2020-06-19 PROCEDURE — 99999 PR PBB SHADOW E&M-EST. PATIENT-LVL IV: ICD-10-PCS | Mod: PBBFAC,,, | Performed by: OBSTETRICS & GYNECOLOGY

## 2020-06-19 PROCEDURE — 87481 CANDIDA DNA AMP PROBE: CPT | Mod: 59

## 2020-06-19 PROCEDURE — 99999 PR PBB SHADOW E&M-EST. PATIENT-LVL IV: CPT | Mod: PBBFAC,,, | Performed by: OBSTETRICS & GYNECOLOGY

## 2020-06-19 PROCEDURE — 99396 PREV VISIT EST AGE 40-64: CPT | Mod: S$GLB,,, | Performed by: OBSTETRICS & GYNECOLOGY

## 2020-06-19 RX ORDER — FLUCONAZOLE 150 MG/1
150 TABLET ORAL ONCE AS NEEDED
Qty: 2 TABLET | Refills: 2 | Status: SHIPPED | OUTPATIENT
Start: 2020-06-19 | End: 2020-06-19

## 2020-06-19 RX ORDER — CLOTRIMAZOLE AND BETAMETHASONE DIPROPIONATE 10; .64 MG/G; MG/G
CREAM TOPICAL
Qty: 30 G | Refills: 1 | Status: SHIPPED | OUTPATIENT
Start: 2020-06-19 | End: 2021-06-19

## 2020-06-19 NOTE — PROGRESS NOTES
CC: Well woman exam    Joaquín Burce is a 49 y.o. female  presents for a well woman exam.  LMP: Patient's last menstrual period was 2020 (approximate)..  No issues, problems, or complaints.  Cycles can be light some months.  Hot flashes    Past Medical History:   Diagnosis Date    Genital herpes     Hyperlipidemia     Hypertension     Morbid obesity with BMI of 40.0-44.9, adult     ETIENNE (obstructive sleep apnea)      Past Surgical History:   Procedure Laterality Date    abdominal plasty  2017    APPENDECTOMY      open, took ovary and fallopian tubes as well    COLONOSCOPY N/A 3/28/2017    Procedure: COLONOSCOPY - Miralax split prep;  Surgeon: Daja Self MD;  Location: [facility];  Service: Endoscopy;  Laterality: N/A;    GASTRECTOMY      HERNIA REPAIR      OOPHORECTOMY      THIGH LIFT       Social History     Socioeconomic History    Marital status: Single     Spouse name: Not on file    Number of children: Not on file    Years of education: Not on file    Highest education level: Not on file   Occupational History    Not on file   Social Needs    Financial resource strain: Not on file    Food insecurity     Worry: Not on file     Inability: Not on file    Transportation needs     Medical: Not on file     Non-medical: Not on file   Tobacco Use    Smoking status: Never Smoker    Smokeless tobacco: Never Used   Substance and Sexual Activity    Alcohol use: Yes     Comment: OCC    Drug use: No    Sexual activity: Yes     Partners: Male     Birth control/protection: None   Lifestyle    Physical activity     Days per week: Not on file     Minutes per session: Not on file    Stress: Not on file   Relationships    Social connections     Talks on phone: Not on file     Gets together: Not on file     Attends Yazidism service: Not on file     Active member of club or organization: Not on file     Attends meetings of clubs or organizations: Not on file      "Relationship status: Not on file   Other Topics Concern    Are you pregnant or think you may be? Not Asked    Breast-feeding Not Asked   Social History Narrative    Works full time as an : [employer].  Single. Lives alone.  Good support system with family and friends.     Family History   Problem Relation Age of Onset    Hypertension Father     Heart disease Father     Cancer Mother         breast, in remission x 5 years    Breast cancer Mother     No Known Problems Sister     Stroke Maternal Grandmother     Obesity Sister     Obesity Sister     Colon cancer Maternal Uncle     Ovarian cancer Maternal Aunt         maternal grandmother's sister dx age unkown     OB History        1    Para   0    Term   0            AB   1    Living           SAB        TAB   1    Ectopic        Multiple        Live Births                     /80   Ht 5' 5" (1.651 m)   Wt 94.7 kg (208 lb 14.2 oz)   LMP 2020 (Approximate)   BMI 34.76 kg/m²       ROS:    ROS:  GENERAL: Denies weight gain or weight loss. Feeling well overall.   SKIN: Denies rash or lesions.   HEAD: Denies head injury or headache.   NODES: Denies enlarged lymph nodes.   CHEST: Denies chest pain or shortness of breath.   CARDIOVASCULAR: Denies palpitations or left sided chest pain.   ABDOMEN: No abdominal pain, constipation, diarrhea, nausea, vomiting or rectal bleeding.   URINARY: No frequency, dysuria, hematuria, or burning on urination.  REPRODUCTIVE: See HPI.   BREASTS: The patient performs breast self-examination and denies pain, lumps, or nipple discharge.   HEMATOLOGIC: No easy bruisability or excessive bleeding.   MUSCULOSKELETAL: Denies joint pain or swelling.   NEUROLOGIC: Denies syncope or weakness.   PSYCHIATRIC: Denies depression, anxiety or mood swings.    PHYSICAL EXAM:    APPEARANCE: Well nourished, well developed, in no acute distress.  AFFECT: WNL, alert and oriented x 3  SKIN: No acne or " hirsutism  NECK: Neck symmetric without masses or thyromegaly  NODES: No inguinal, cervical, axillary, or femoral lymph node enlargement  CHEST: Good respiratory effect  ABDOMEN: Soft.  No tenderness or masses.  No hepatosplenomegaly.  No hernias.  BREASTS: Symmetrical, no skin changes or visible lesions.  No palpable masses, nipple discharge bilaterally.  PELVIC: Normal external genitalia without lesions.  Normal hair distribution.  Adequate perineal body, normal urethral meatus.  Vagina moist and well rugated without lesions or discharge.  Cervix pink, without lesions, discharge or tenderness.  No significant cystocele or rectocele.  Bimanual exam shows uterus to be normal size, regular, mobile and nontender.  Adnexa without masses or tenderness.    RECTAL: Rectovaginal exam confirms above with normal sphincter tone, no masses.  EXTREMITIES: No edema.      ICD-10-CM ICD-9-CM    1. Encounter for gynecological examination without abnormal finding  Z01.419 V72.31    2. History of trichomoniasis  Z86.19 V12.09 Vaginosis Screen by DNA Probe   3. Candida infection  B37.9 112.9 clotrimazole-betamethasone 1-0.05% (LOTRISONE) cream      fluconazole (DIFLUCAN) 150 MG Tab       Vaginitis prevention including :   a. avoiding feminine products such as deoderant soaps, body wash, bubble bath, douches, scented toilet paper, deoderant tampons or pads, baby or feminine wipes, chronic pad use, etc. and   b. avoiding other vulvovaginal irritants such as long hot baths, humidity, tight, synthetic clothing, chlorine and sitting around in wet bathing suits and   c. wearing cotton underwear, avoiding thong underwear and no underwear to bed and   d. taking showers instead of baths and use a hair dryer on cool setting afterwards to dry and   e.wearing cotton to exercise and shower immediately after exercise and change clothes and   f. using polyurethane condoms without spermicide if sexually active and symptoms are triggered by  intercourse;   Diflucan and Mycolog cream use and potential side effects;   -pelvic rest until symptoms resolve.           Patient was counseled today on A.C.S. Pap guidelines and recommendations for yearly pelvic exams, mammograms and monthly self breast exams; to see her PCP for other health maintenance.     No follow-ups on file.

## 2020-06-26 LAB
BACTERIAL VAGINOSIS DNA: NEGATIVE
CANDIDA GLABRATA DNA: NEGATIVE
CANDIDA KRUSEI DNA: NEGATIVE
CANDIDA RRNA VAG QL PROBE: NEGATIVE
T VAGINALIS RRNA GENITAL QL PROBE: NEGATIVE

## 2020-10-21 ENCOUNTER — PATIENT MESSAGE (OUTPATIENT)
Dept: PULMONOLOGY | Facility: CLINIC | Age: 50
End: 2020-10-21

## 2020-11-11 ENCOUNTER — OFFICE VISIT (OUTPATIENT)
Dept: URGENT CARE | Facility: CLINIC | Age: 50
End: 2020-11-11
Payer: COMMERCIAL

## 2020-11-11 VITALS
DIASTOLIC BLOOD PRESSURE: 76 MMHG | WEIGHT: 208 LBS | HEART RATE: 73 BPM | TEMPERATURE: 99 F | RESPIRATION RATE: 18 BRPM | BODY MASS INDEX: 34.66 KG/M2 | OXYGEN SATURATION: 97 % | HEIGHT: 65 IN | SYSTOLIC BLOOD PRESSURE: 111 MMHG

## 2020-11-11 DIAGNOSIS — S61.432A PUNCTURE WOUND OF LEFT HAND WITHOUT FOREIGN BODY, INITIAL ENCOUNTER: Primary | ICD-10-CM

## 2020-11-11 PROCEDURE — 99214 OFFICE O/P EST MOD 30 MIN: CPT | Mod: S$GLB,,, | Performed by: NURSE PRACTITIONER

## 2020-11-11 PROCEDURE — 99214 PR OFFICE/OUTPT VISIT, EST, LEVL IV, 30-39 MIN: ICD-10-PCS | Mod: S$GLB,,, | Performed by: NURSE PRACTITIONER

## 2020-11-11 RX ORDER — MUPIROCIN 20 MG/G
OINTMENT TOPICAL 2 TIMES DAILY
Qty: 30 G | Refills: 0 | Status: SHIPPED | OUTPATIENT
Start: 2020-11-11 | End: 2020-11-16

## 2020-11-11 NOTE — PROGRESS NOTES
"Subjective:       Patient ID: Joaquín Bruce is a 50 y.o. female.    Vitals:  height is 5' 5" (1.651 m) and weight is 94.3 kg (208 lb). Her temperature is 99 °F (37.2 °C). Her blood pressure is 111/76 and her pulse is 73. Her respiration is 18 and oxygen saturation is 97%.     Chief Complaint: Hand Injury (Left Hand )    This is a 50 y.o. female who presents today with a chief complaint of left hand wound, patient reports she tripped and tried to catch herself with her hands, patient reports nail was on the ground and hit her left hand, patient reports she was wearing gloves but she did not notice the break in glove, when she took off her gloves she noticed the wound on her left hand, patient tetanus up-to-date, patient denies numbness and tingling to left hand or arm, denies radiating arm or hand pain, denies extremity weakness, patient reports she washed her hand really good afterwards, patient reports incident happened yesterday  Patient denies dizziness or positional lightheadedness, denies chest pain, denies paresthesia, denies hand  weakness, denies loss of finger dexterity, or any other symptoms, denies loss of consciousness or head trauma or injury          Hand Injury   Her dominant hand is their right hand. The incident occurred 12 to 24 hours ago. The incident occurred at home. The injury mechanism was a fall. The pain is present in the left hand. The quality of the pain is described as aching. The pain does not radiate. The pain is at a severity of 3/10. The pain is mild. The pain has been fluctuating since the incident. The symptoms are aggravated by movement. She has tried nothing for the symptoms. The treatment provided no relief.       Constitution: Negative for fatigue.   HENT: Negative for facial swelling and facial trauma.    Neck: Negative for neck stiffness.   Cardiovascular: Negative for chest trauma.   Eyes: Negative for eye trauma, double vision and blurred vision. "   Gastrointestinal: Negative for abdominal trauma, abdominal pain and rectal bleeding.   Genitourinary: Negative for hematuria, missed menses, genital trauma and pelvic pain.   Musculoskeletal: Negative for pain, trauma, joint swelling and abnormal ROM of joint.   Skin: Positive for puncture wound. Negative for color change, wound, abrasion, laceration and bruising.   Neurological: Negative for dizziness, history of vertigo, light-headedness, coordination disturbances, altered mental status and loss of consciousness.   Hematologic/Lymphatic: Negative for history of bleeding disorder.   Psychiatric/Behavioral: Negative for altered mental status.       Objective:      Physical Exam   Constitutional: She is oriented to person, place, and time. She appears well-developed. She is cooperative.  Non-toxic appearance. She does not appear ill. No distress.   HENT:   Head: Normocephalic and atraumatic. Head is without abrasion, without contusion and without laceration.   Ears:   Right Ear: Hearing, tympanic membrane, external ear and ear canal normal. No hemotympanum.   Left Ear: Hearing, tympanic membrane, external ear and ear canal normal. No hemotympanum.   Nose: Nose normal. No mucosal edema, rhinorrhea or nasal deformity. No epistaxis. Right sinus exhibits no maxillary sinus tenderness and no frontal sinus tenderness. Left sinus exhibits no maxillary sinus tenderness and no frontal sinus tenderness.   Mouth/Throat: Uvula is midline, oropharynx is clear and moist and mucous membranes are normal. No trismus in the jaw. Normal dentition. No uvula swelling. No posterior oropharyngeal erythema.   Eyes: Pupils are equal, round, and reactive to light. Conjunctivae, EOM and lids are normal. Right eye exhibits no discharge. Left eye exhibits no discharge. No scleral icterus.   Neck: Trachea normal, normal range of motion, full passive range of motion without pain and phonation normal. Neck supple. No spinous process tenderness  and no muscular tenderness present. No neck rigidity. No tracheal deviation present.   Cardiovascular: Normal rate, regular rhythm, normal heart sounds and normal pulses.   Pulmonary/Chest: Effort normal and breath sounds normal. No respiratory distress.   Abdominal: Soft. Normal appearance and bowel sounds are normal. She exhibits no distension, no pulsatile midline mass and no mass. There is no abdominal tenderness.   Musculoskeletal: Normal range of motion.         General: No deformity.      Left hand: Normal. She exhibits normal range of motion, no tenderness, no bony tenderness, normal two-point discrimination, normal capillary refill, no deformity, no laceration and no swelling. Normal sensation noted. Decreased sensation is not present in the ulnar distribution, is not present in the medial redistribution and is not present in the radial distribution. Normal strength noted. She exhibits no finger abduction, no thumb/finger opposition and no wrist extension trouble.        Hands:       Comments: no dec ROM of left hand/wrist, able to perform flexion/extension, pronation and supination with no pain  Cap refill 2 seconds, left radial pulse 2+, sensation intact     Neurological: She is alert and oriented to person, place, and time. She has normal strength. No cranial nerve deficit or sensory deficit. She exhibits normal muscle tone. She displays no seizure activity. Coordination normal. GCS eye subscore is 4. GCS verbal subscore is 5. GCS motor subscore is 6.   Skin: Skin is warm, dry, intact, not diaphoretic and not pale. Capillary refill takes less than 2 seconds. abrasion, burn, bruising and ecchymosisPsychiatric: Her speech is normal and behavior is normal. Judgment and thought content normal.   Nursing note and vitals reviewed.              Patient in no acute distress.  Vitals reassuring.  X-ray offered, patient declined at this time.  Wound cleaned in clinic.  Detailed education provided about superficial  puncture wound care.  Red flags discussed with patient in detail.  No signs of cellulitis. I Strongly recommended re-evaluation if symptom worsens or does not improve.  Discussed results/diagnosis/plan in depth with patient in clinic. Strict precautions given to patient to monitor for worsening signs and symptoms. Advised to follow up with primary.All questions answered. Strict ER precautions given. If your symptoms worsens of fail to improve you should go to the Emergency Room. Discharge and follow-up instructions given verbally/printed. Patient voiced understanding and in agreement with current treatment plan.        Assessment:       1. Puncture wound of left hand without foreign body, initial encounter        Plan:         Puncture wound of left hand without foreign body, initial encounter  -     mupirocin (BACTROBAN) 2 % ointment; Apply topically 2 (two) times daily. for 5 days  Dispense: 30 g; Refill: 0      Patient Instructions   If your condition worsens or fails to improve we recommend that you receive another evaluation at the ER immediately or contact your PCP to discuss your concerns or return here. You must understand that you've received an urgent care treatment only and that you may be released before all your medical problems are known or treated. You the patient will arrange for followup care as instructed.    If you were prescribed antibiotics, please take them to completion.  If you were prescribed a narcotic medication, do not drive or operate heavy equipment or machinery while taking these medications.  Please follow up with your primary care doctor or specialist as needed.  If you  smoke, please stop smoking.    Puncture Wound       A puncture wound occurs when a pointed object pushes into the skin. It may go into the tissues below the skin, including fat and muscle. This type of wound is narrow and deep and can be hard to clean. Because of this, puncture wounds are at high risk for becoming  infected.  X-rays may be done to check the wound for objects stuck under the skin. Your may also need a tetanus shot. This is given if you are not up to date on this vaccination and the object that caused the wound may lead to tetanus.  Home care  · Your healthcare provider may prescribe an antibiotic. This is to help prevent infection. Follow all instructions for taking this medicine. Take the medicine every day until it is gone or you are told to stop. You should not have any left over.  · The healthcare provider may prescribe medicines for pain. Follow instructions for taking them.  · You can take acetaminophen or ibuprofen for pain, unless you were given a different pain medicine to use.   · Follow the healthcare providers instructions on how to care for the wound.  · Keep the wound clean and dry. Do not get the wound wet until you are told it is okay to do so. If the area gets wet, gently pat it dry with a clean cloth. Replace the wet bandage with a dry one.  · If a bandage was applied and it becomes wet or dirty, replace it. Otherwise, leave it in place for the first 24 hours.  · Once you can get the wound wet, you may shower as usual but do not soak the wound in water (no tub baths or swimming)  · Even with proper treatment, a puncture wound may become infected. Check the wound daily for signs of infection listed below.  Follow-up care  Follow up with your healthcare provider as advised.   When to seek medical advice  Call your healthcare provider right away if any of these occur:  · Signs of infection, including:  ¨ Increasing redness or swelling around the wound  ¨ Increased warmth of the wound  ¨ Worsening pain  ¨ Red streaking lines away from the wound  ¨ Draining pus  · Fever of 100.4°F (38.ºC) or higher or as directed by your healthcare provider  · Wound changes colors  · Numbness around the wound  · Decreased movement around the injured area  Date Last Reviewed: 8/24/2015  © 2941-2884 [employer]. [address] [place] [address], [place] [address]. All rights reserved. This information is not intended as a substitute for professional medical care. Always follow your healthcare professional's instructions.

## 2020-11-11 NOTE — PATIENT INSTRUCTIONS
If your condition worsens or fails to improve we recommend that you receive another evaluation at the ER immediately or contact your PCP to discuss your concerns or return here. You must understand that you've received an urgent care treatment only and that you may be released before all your medical problems are known or treated. You the patient will arrange for followup care as instructed.    If you were prescribed antibiotics, please take them to completion.  If you were prescribed a narcotic medication, do not drive or operate heavy equipment or machinery while taking these medications.  Please follow up with your primary care doctor or specialist as needed.  If you  smoke, please stop smoking.    Puncture Wound       A puncture wound occurs when a pointed object pushes into the skin. It may go into the tissues below the skin, including fat and muscle. This type of wound is narrow and deep and can be hard to clean. Because of this, puncture wounds are at high risk for becoming infected.  X-rays may be done to check the wound for objects stuck under the skin. Your may also need a tetanus shot. This is given if you are not up to date on this vaccination and the object that caused the wound may lead to tetanus.  Home care  · Your healthcare provider may prescribe an antibiotic. This is to help prevent infection. Follow all instructions for taking this medicine. Take the medicine every day until it is gone or you are told to stop. You should not have any left over.  · The healthcare provider may prescribe medicines for pain. Follow instructions for taking them.  · You can take acetaminophen or ibuprofen for pain, unless you were given a different pain medicine to use.   · Follow the healthcare providers instructions on how to care for the wound.  · Keep the wound clean and dry. Do not get the wound wet until you are told it is okay to do so. If the area gets wet, gently pat it dry with a clean cloth. Replace the  wet bandage with a dry one.  · If a bandage was applied and it becomes wet or dirty, replace it. Otherwise, leave it in place for the first 24 hours.  · Once you can get the wound wet, you may shower as usual but do not soak the wound in water (no tub baths or swimming)  · Even with proper treatment, a puncture wound may become infected. Check the wound daily for signs of infection listed below.  Follow-up care  Follow up with your healthcare provider as advised.   When to seek medical advice  Call your healthcare provider right away if any of these occur:  · Signs of infection, including:  ¨ Increasing redness or swelling around the wound  ¨ Increased warmth of the wound  ¨ Worsening pain  ¨ Red streaking lines away from the wound  ¨ Draining pus  · Fever of 100.4°F (38.ºC) or higher or as directed by your healthcare provider  · Wound changes colors  · Numbness around the wound  · Decreased movement around the injured area  Date Last Reviewed: 8/24/2015  © 8461-7672 [employer] [address] [place] [address], [place] [address]. All rights reserved. This information is not intended as a substitute for professional medical care. Always follow your healthcare professional's instructions.

## 2021-05-03 DIAGNOSIS — Z00.00 ROUTINE GENERAL MEDICAL EXAMINATION AT A HEALTH CARE FACILITY: Primary | ICD-10-CM

## 2021-05-06 ENCOUNTER — HOSPITAL ENCOUNTER (OUTPATIENT)
Dept: RADIOLOGY | Facility: HOSPITAL | Age: 51
Discharge: HOME | End: 2021-05-06
Attending: INTERNAL MEDICINE
Payer: COMMERCIAL

## 2021-05-06 ENCOUNTER — OFFICE VISIT (OUTPATIENT)
Dept: PULMONOLOGY | Facility: CLINIC | Age: 51
End: 2021-05-06
Payer: COMMERCIAL

## 2021-05-06 ENCOUNTER — CLINICAL SUPPORT (OUTPATIENT)
Dept: INTERNAL MEDICINE | Facility: CLINIC | Age: 51
End: 2021-05-06
Payer: COMMERCIAL

## 2021-05-06 ENCOUNTER — HOSPITAL ENCOUNTER (OUTPATIENT)
Dept: CARDIOLOGY | Facility: CLINIC | Age: 51
Discharge: HOME | End: 2021-05-06
Payer: COMMERCIAL

## 2021-05-06 VITALS — HEIGHT: 65 IN | WEIGHT: 208 LBS | BODY MASS INDEX: 34.66 KG/M2

## 2021-05-06 VITALS
DIASTOLIC BLOOD PRESSURE: 78 MMHG | HEIGHT: 65 IN | WEIGHT: 208 LBS | HEART RATE: 65 BPM | BODY MASS INDEX: 34.66 KG/M2 | SYSTOLIC BLOOD PRESSURE: 133 MMHG

## 2021-05-06 DIAGNOSIS — G47.8 INSOMNIA DISORDER, WITH OTHER SLEEP DISORDER, RECURRENT: Primary | ICD-10-CM

## 2021-05-06 DIAGNOSIS — G47.33 OSA (OBSTRUCTIVE SLEEP APNEA): ICD-10-CM

## 2021-05-06 DIAGNOSIS — Z00.00 ROUTINE GENERAL MEDICAL EXAMINATION AT A HEALTH CARE FACILITY: ICD-10-CM

## 2021-05-06 DIAGNOSIS — Z00.00 ANNUAL PHYSICAL EXAM: ICD-10-CM

## 2021-05-06 DIAGNOSIS — Z00.00 ANNUAL PHYSICAL EXAM: Primary | ICD-10-CM

## 2021-05-06 DIAGNOSIS — G47.00 INSOMNIA DISORDER, WITH OTHER SLEEP DISORDER, RECURRENT: Primary | ICD-10-CM

## 2021-05-06 LAB
ALBUMIN SERPL BCP-MCNC: 3.7 G/DL (ref 3.5–5.2)
ALP SERPL-CCNC: 35 U/L (ref 55–135)
ALT SERPL W/O P-5'-P-CCNC: 15 U/L (ref 10–44)
ANION GAP SERPL CALC-SCNC: 6 MMOL/L (ref 8–16)
AST SERPL-CCNC: 16 U/L (ref 10–40)
BILIRUB SERPL-MCNC: 0.3 MG/DL (ref 0.1–1)
BUN SERPL-MCNC: 12 MG/DL (ref 6–20)
CALCIUM SERPL-MCNC: 9.9 MG/DL (ref 8.7–10.5)
CHLORIDE SERPL-SCNC: 106 MMOL/L (ref 95–110)
CHOLEST SERPL-MCNC: 179 MG/DL (ref 120–199)
CHOLEST/HDLC SERPL: 3.1 {RATIO} (ref 2–5)
CO2 SERPL-SCNC: 28 MMOL/L (ref 23–29)
CREAT SERPL-MCNC: 0.9 MG/DL (ref 0.5–1.4)
ERYTHROCYTE [DISTWIDTH] IN BLOOD BY AUTOMATED COUNT: 13.8 % (ref 11.5–14.5)
EST. GFR  (AFRICAN AMERICAN): >60 ML/MIN/1.73 M^2
EST. GFR  (NON AFRICAN AMERICAN): >60 ML/MIN/1.73 M^2
GLUCOSE SERPL-MCNC: 87 MG/DL (ref 70–110)
HCT VFR BLD AUTO: 40.2 % (ref 37–48.5)
HDLC SERPL-MCNC: 57 MG/DL (ref 40–75)
HDLC SERPL: 31.8 % (ref 20–50)
HGB BLD-MCNC: 12.5 G/DL (ref 12–16)
LDLC SERPL CALC-MCNC: 108.2 MG/DL (ref 63–159)
MCH RBC QN AUTO: 27.4 PG (ref 27–31)
MCHC RBC AUTO-ENTMCNC: 31.1 G/DL (ref 32–36)
MCV RBC AUTO: 88 FL (ref 82–98)
NONHDLC SERPL-MCNC: 122 MG/DL
PLATELET # BLD AUTO: 294 K/UL (ref 150–450)
PMV BLD AUTO: 9.3 FL (ref 9.2–12.9)
POTASSIUM SERPL-SCNC: 4.5 MMOL/L (ref 3.5–5.1)
PROT SERPL-MCNC: 7.5 G/DL (ref 6–8.4)
RBC # BLD AUTO: 4.56 M/UL (ref 4–5.4)
SODIUM SERPL-SCNC: 140 MMOL/L (ref 136–145)
TRIGL SERPL-MCNC: 69 MG/DL (ref 30–150)
WBC # BLD AUTO: 7.58 K/UL (ref 3.9–12.7)

## 2021-05-06 PROCEDURE — 85027 COMPLETE CBC AUTOMATED: CPT | Performed by: INTERNAL MEDICINE

## 2021-05-06 PROCEDURE — 77067 SCR MAMMO BI INCL CAD: CPT | Mod: 26,,, | Performed by: RADIOLOGY

## 2021-05-06 PROCEDURE — 77067 SCR MAMMO BI INCL CAD: CPT | Mod: TC

## 2021-05-06 PROCEDURE — 99386 PR PREVENTIVE VISIT,NEW,40-64: ICD-10-PCS | Mod: S$GLB,,, | Performed by: INTERNAL MEDICINE

## 2021-05-06 PROCEDURE — 77067 MAMMO DIGITAL SCREENING BILAT WITH TOMO: ICD-10-PCS | Mod: 26,,, | Performed by: RADIOLOGY

## 2021-05-06 PROCEDURE — 99999 PR PBB SHADOW E&M-EST. PATIENT-LVL III: ICD-10-PCS | Mod: PBBFAC,,, | Performed by: INTERNAL MEDICINE

## 2021-05-06 PROCEDURE — 77063 BREAST TOMOSYNTHESIS BI: CPT | Mod: 26,,, | Performed by: RADIOLOGY

## 2021-05-06 PROCEDURE — 93010 EKG 12-LEAD: ICD-10-PCS | Mod: S$GLB,,, | Performed by: INTERNAL MEDICINE

## 2021-05-06 PROCEDURE — 99386 PREV VISIT NEW AGE 40-64: CPT | Mod: S$GLB,,, | Performed by: INTERNAL MEDICINE

## 2021-05-06 PROCEDURE — 93005 ELECTROCARDIOGRAM TRACING: CPT | Mod: S$GLB,,, | Performed by: INTERNAL MEDICINE

## 2021-05-06 PROCEDURE — 80053 COMPREHEN METABOLIC PANEL: CPT | Performed by: INTERNAL MEDICINE

## 2021-05-06 PROCEDURE — 93010 ELECTROCARDIOGRAM REPORT: CPT | Mod: S$GLB,,, | Performed by: INTERNAL MEDICINE

## 2021-05-06 PROCEDURE — 80061 LIPID PANEL: CPT | Performed by: INTERNAL MEDICINE

## 2021-05-06 PROCEDURE — 99999 PR PBB SHADOW E&M-EST. PATIENT-LVL III: CPT | Mod: PBBFAC,,, | Performed by: INTERNAL MEDICINE

## 2021-05-06 PROCEDURE — 36415 COLL VENOUS BLD VENIPUNCTURE: CPT | Performed by: INTERNAL MEDICINE

## 2021-05-06 PROCEDURE — 77063 MAMMO DIGITAL SCREENING BILAT WITH TOMO: ICD-10-PCS | Mod: 26,,, | Performed by: RADIOLOGY

## 2021-05-06 PROCEDURE — 93005 EKG 12-LEAD: ICD-10-PCS | Mod: S$GLB,,, | Performed by: INTERNAL MEDICINE

## 2021-05-06 RX ORDER — ZOLPIDEM TARTRATE 10 MG/1
TABLET ORAL
Qty: 30 TABLET | Refills: 3 | Status: SHIPPED | OUTPATIENT
Start: 2021-05-06 | End: 2021-11-17 | Stop reason: SDUPTHER

## 2021-05-14 ENCOUNTER — OFFICE VISIT (OUTPATIENT)
Dept: OBSTETRICS AND GYNECOLOGY | Facility: CLINIC | Age: 51
End: 2021-05-14
Payer: COMMERCIAL

## 2021-05-14 VITALS
SYSTOLIC BLOOD PRESSURE: 120 MMHG | BODY MASS INDEX: 35.67 KG/M2 | DIASTOLIC BLOOD PRESSURE: 68 MMHG | WEIGHT: 214.06 LBS | HEIGHT: 65 IN

## 2021-05-14 DIAGNOSIS — Z01.419 ENCOUNTER FOR GYNECOLOGICAL EXAMINATION WITHOUT ABNORMAL FINDING: Primary | ICD-10-CM

## 2021-05-14 DIAGNOSIS — E66.01 MORBIDLY OBESE: ICD-10-CM

## 2021-05-14 DIAGNOSIS — Z91.89 INCREASED RISK OF BREAST CANCER: ICD-10-CM

## 2021-05-14 PROCEDURE — 99396 PREV VISIT EST AGE 40-64: CPT | Mod: S$GLB,,, | Performed by: OBSTETRICS & GYNECOLOGY

## 2021-05-14 PROCEDURE — 99999 PR PBB SHADOW E&M-EST. PATIENT-LVL III: CPT | Mod: PBBFAC,,, | Performed by: OBSTETRICS & GYNECOLOGY

## 2021-05-14 PROCEDURE — 99396 PR PREVENTIVE VISIT,EST,40-64: ICD-10-PCS | Mod: S$GLB,,, | Performed by: OBSTETRICS & GYNECOLOGY

## 2021-05-14 PROCEDURE — 99999 PR PBB SHADOW E&M-EST. PATIENT-LVL III: ICD-10-PCS | Mod: PBBFAC,,, | Performed by: OBSTETRICS & GYNECOLOGY

## 2021-11-17 DIAGNOSIS — G47.8 INSOMNIA DISORDER, WITH OTHER SLEEP DISORDER, RECURRENT: ICD-10-CM

## 2021-11-17 DIAGNOSIS — G47.33 OSA (OBSTRUCTIVE SLEEP APNEA): ICD-10-CM

## 2021-11-17 DIAGNOSIS — G47.00 INSOMNIA DISORDER, WITH OTHER SLEEP DISORDER, RECURRENT: ICD-10-CM

## 2021-11-18 ENCOUNTER — PATIENT MESSAGE (OUTPATIENT)
Dept: PULMONOLOGY | Facility: CLINIC | Age: 51
End: 2021-11-18
Payer: COMMERCIAL

## 2021-11-18 RX ORDER — ZOLPIDEM TARTRATE 10 MG/1
TABLET ORAL
Qty: 30 TABLET | Refills: 3 | Status: SHIPPED | OUTPATIENT
Start: 2021-11-18 | End: 2022-06-03 | Stop reason: SDUPTHER

## 2022-03-07 ENCOUNTER — TELEPHONE (OUTPATIENT)
Dept: PULMONOLOGY | Facility: CLINIC | Age: 52
End: 2022-03-07
Payer: COMMERCIAL

## 2022-03-07 DIAGNOSIS — K21.9 GASTROESOPHAGEAL REFLUX DISEASE: ICD-10-CM

## 2022-03-07 DIAGNOSIS — Z98.84 STATUS POST LAPAROSCOPIC SLEEVE GASTRECTOMY: ICD-10-CM

## 2022-03-07 RX ORDER — OMEPRAZOLE 40 MG/1
40 CAPSULE, DELAYED RELEASE ORAL DAILY
Qty: 90 CAPSULE | Refills: 3 | Status: CANCELLED | OUTPATIENT
Start: 2022-03-07

## 2022-04-12 ENCOUNTER — PATIENT MESSAGE (OUTPATIENT)
Dept: PULMONOLOGY | Facility: CLINIC | Age: 52
End: 2022-04-12
Payer: COMMERCIAL

## 2022-04-12 DIAGNOSIS — Z00.00 ROUTINE GENERAL MEDICAL EXAMINATION AT A HEALTH CARE FACILITY: Primary | ICD-10-CM

## 2022-04-13 DIAGNOSIS — Z98.84 STATUS POST LAPAROSCOPIC SLEEVE GASTRECTOMY: ICD-10-CM

## 2022-04-13 DIAGNOSIS — K21.9 GASTROESOPHAGEAL REFLUX DISEASE: ICD-10-CM

## 2022-04-16 RX ORDER — OMEPRAZOLE 40 MG/1
40 CAPSULE, DELAYED RELEASE ORAL DAILY
Qty: 90 CAPSULE | Refills: 3 | Status: SHIPPED | OUTPATIENT
Start: 2022-04-16 | End: 2022-09-22 | Stop reason: SDUPTHER

## 2022-05-12 ENCOUNTER — HOSPITAL ENCOUNTER (OUTPATIENT)
Dept: CARDIOLOGY | Facility: CLINIC | Age: 52
Discharge: HOME | End: 2022-05-12
Payer: COMMERCIAL

## 2022-05-12 ENCOUNTER — OFFICE VISIT (OUTPATIENT)
Dept: PULMONOLOGY | Facility: CLINIC | Age: 52
End: 2022-05-12
Payer: COMMERCIAL

## 2022-05-12 ENCOUNTER — HOSPITAL ENCOUNTER (OUTPATIENT)
Dept: RADIOLOGY | Facility: CLINIC | Age: 52
Discharge: HOME | End: 2022-05-12
Attending: INTERNAL MEDICINE
Payer: COMMERCIAL

## 2022-05-12 ENCOUNTER — HOSPITAL ENCOUNTER (OUTPATIENT)
Dept: RADIOLOGY | Facility: HOSPITAL | Age: 52
Discharge: HOME | End: 2022-05-12
Attending: INTERNAL MEDICINE
Payer: COMMERCIAL

## 2022-05-12 ENCOUNTER — CLINICAL SUPPORT (OUTPATIENT)
Dept: INTERNAL MEDICINE | Facility: CLINIC | Age: 52
End: 2022-05-12
Payer: COMMERCIAL

## 2022-05-12 VITALS — WEIGHT: 216 LBS | HEIGHT: 65 IN | BODY MASS INDEX: 35.99 KG/M2

## 2022-05-12 DIAGNOSIS — Z00.00 ROUTINE GENERAL MEDICAL EXAMINATION AT A HEALTH CARE FACILITY: ICD-10-CM

## 2022-05-12 DIAGNOSIS — Z00.00 ANNUAL PHYSICAL EXAM: Primary | ICD-10-CM

## 2022-05-12 LAB
ALBUMIN SERPL BCP-MCNC: 3.9 G/DL (ref 3.5–5.2)
ALP SERPL-CCNC: 37 U/L (ref 55–135)
ALT SERPL W/O P-5'-P-CCNC: 14 U/L (ref 10–44)
ANION GAP SERPL CALC-SCNC: 7 MMOL/L (ref 8–16)
AST SERPL-CCNC: 14 U/L (ref 10–40)
BILIRUB SERPL-MCNC: 0.4 MG/DL (ref 0.1–1)
BUN SERPL-MCNC: 10 MG/DL (ref 6–20)
CALCIUM SERPL-MCNC: 9.7 MG/DL (ref 8.7–10.5)
CHLORIDE SERPL-SCNC: 106 MMOL/L (ref 95–110)
CHOLEST SERPL-MCNC: 183 MG/DL (ref 120–199)
CHOLEST/HDLC SERPL: 3.3 {RATIO} (ref 2–5)
CO2 SERPL-SCNC: 26 MMOL/L (ref 23–29)
CREAT SERPL-MCNC: 0.8 MG/DL (ref 0.5–1.4)
ERYTHROCYTE [DISTWIDTH] IN BLOOD BY AUTOMATED COUNT: 15.1 % (ref 11.5–14.5)
EST. GFR  (AFRICAN AMERICAN): >60 ML/MIN/1.73 M^2
EST. GFR  (NON AFRICAN AMERICAN): >60 ML/MIN/1.73 M^2
GLUCOSE SERPL-MCNC: 79 MG/DL (ref 70–110)
HCT VFR BLD AUTO: 38.7 % (ref 37–48.5)
HDLC SERPL-MCNC: 56 MG/DL (ref 40–75)
HDLC SERPL: 30.6 % (ref 20–50)
HGB BLD-MCNC: 12.2 G/DL (ref 12–16)
LDLC SERPL CALC-MCNC: 103.2 MG/DL (ref 63–159)
MCH RBC QN AUTO: 28.2 PG (ref 27–31)
MCHC RBC AUTO-ENTMCNC: 31.5 G/DL (ref 32–36)
MCV RBC AUTO: 90 FL (ref 82–98)
NONHDLC SERPL-MCNC: 127 MG/DL
PLATELET # BLD AUTO: 293 K/UL (ref 150–450)
PMV BLD AUTO: 9.6 FL (ref 9.2–12.9)
POTASSIUM SERPL-SCNC: 4.4 MMOL/L (ref 3.5–5.1)
PROT SERPL-MCNC: 7 G/DL (ref 6–8.4)
RBC # BLD AUTO: 4.32 M/UL (ref 4–5.4)
SODIUM SERPL-SCNC: 139 MMOL/L (ref 136–145)
TRIGL SERPL-MCNC: 119 MG/DL (ref 30–150)
WBC # BLD AUTO: 6.95 K/UL (ref 3.9–12.7)

## 2022-05-12 PROCEDURE — 85027 COMPLETE CBC AUTOMATED: CPT | Performed by: INTERNAL MEDICINE

## 2022-05-12 PROCEDURE — 99999 PR PBB SHADOW E&M-EST. PATIENT-LVL I: ICD-10-PCS | Mod: PBBFAC,,,

## 2022-05-12 PROCEDURE — 99999 PR PBB SHADOW E&M-EST. PATIENT-LVL III: CPT | Mod: PBBFAC,,, | Performed by: INTERNAL MEDICINE

## 2022-05-12 PROCEDURE — 77063 BREAST TOMOSYNTHESIS BI: CPT | Mod: 26,,, | Performed by: RADIOLOGY

## 2022-05-12 PROCEDURE — 99386 PR PREVENTIVE VISIT,NEW,40-64: ICD-10-PCS | Mod: S$GLB,,, | Performed by: INTERNAL MEDICINE

## 2022-05-12 PROCEDURE — 77080 DXA BONE DENSITY AXIAL: CPT | Mod: TC

## 2022-05-12 PROCEDURE — 77063 MAMMO DIGITAL SCREENING BILAT WITH TOMO: ICD-10-PCS | Mod: 26,,, | Performed by: RADIOLOGY

## 2022-05-12 PROCEDURE — 77063 BREAST TOMOSYNTHESIS BI: CPT | Mod: TC

## 2022-05-12 PROCEDURE — 80053 COMPREHEN METABOLIC PANEL: CPT | Performed by: INTERNAL MEDICINE

## 2022-05-12 PROCEDURE — 93005 EKG 12-LEAD: ICD-10-PCS | Mod: S$GLB,,, | Performed by: INTERNAL MEDICINE

## 2022-05-12 PROCEDURE — 93010 EKG 12-LEAD: ICD-10-PCS | Mod: S$GLB,,, | Performed by: INTERNAL MEDICINE

## 2022-05-12 PROCEDURE — 77067 MAMMO DIGITAL SCREENING BILAT WITH TOMO: ICD-10-PCS | Mod: 26,,, | Performed by: RADIOLOGY

## 2022-05-12 PROCEDURE — 80061 LIPID PANEL: CPT | Performed by: INTERNAL MEDICINE

## 2022-05-12 PROCEDURE — 93010 ELECTROCARDIOGRAM REPORT: CPT | Mod: S$GLB,,, | Performed by: INTERNAL MEDICINE

## 2022-05-12 PROCEDURE — 77067 SCR MAMMO BI INCL CAD: CPT | Mod: 26,,, | Performed by: RADIOLOGY

## 2022-05-12 PROCEDURE — 77080 DXA BONE DENSITY AXIAL: CPT | Mod: 26,,, | Performed by: INTERNAL MEDICINE

## 2022-05-12 PROCEDURE — 77080 DEXA BONE DENSITY SPINE HIP: ICD-10-PCS | Mod: 26,,, | Performed by: INTERNAL MEDICINE

## 2022-05-12 PROCEDURE — 99999 PR PBB SHADOW E&M-EST. PATIENT-LVL I: CPT | Mod: PBBFAC,,,

## 2022-05-12 PROCEDURE — 99999 PR PBB SHADOW E&M-EST. PATIENT-LVL III: ICD-10-PCS | Mod: PBBFAC,,, | Performed by: INTERNAL MEDICINE

## 2022-05-12 PROCEDURE — 99386 PREV VISIT NEW AGE 40-64: CPT | Mod: S$GLB,,, | Performed by: INTERNAL MEDICINE

## 2022-05-12 PROCEDURE — 93005 ELECTROCARDIOGRAM TRACING: CPT | Mod: S$GLB,,, | Performed by: INTERNAL MEDICINE

## 2022-05-12 NOTE — PROGRESS NOTES
Subjective:       Patient ID: Joaquín Bruce is a 51 y.o. female.    Chief Complaint: No chief complaint on file.    HPI  50 yo [employer]  originally from [place]. She has done well after a bariatric surgery by Dr. Dye in 2015. She has a vague pain in her right hip. She says it began after working in her yard moving sand. Is better, but sill mildly symptomatic. Has good range of motion. Suggested 2 alleve bid for a week, call  for status.  May need orthopedic follow up. Dr. Salas has been recommended.   Review of Systems   Constitutional: Negative.    HENT: Negative.    Eyes: Negative.    Respiratory: Negative.    Cardiovascular: Negative.    Gastrointestinal: Negative.         S/P gastric  Sleeve procedure by Dr. Dye, 2015    Genitourinary: Negative.    Musculoskeletal: Positive for arthralgias.        Right hip pain  Will try alleve 2 tabs bid for a week then check back.   Integumentary:  Negative.   Neurological: Negative.    Psychiatric/Behavioral: Negative.    All other systems reviewed and are negative.        Objective:      Physical Exam  Vitals and nursing note reviewed.   Constitutional:       General: She is not in acute distress.     Appearance: She is well-developed. She is obese.      Comments: BMI: 35.94  Had gastric sleeve in 2015.    HENT:      Head: Normocephalic and atraumatic.      Right Ear: External ear normal.      Left Ear: External ear normal.      Nose: Nose normal.   Eyes:      Conjunctiva/sclera: Conjunctivae normal.      Pupils: Pupils are equal, round, and reactive to light.   Neck:      Thyroid: No thyromegaly.      Vascular: No JVD.   Cardiovascular:      Rate and Rhythm: Normal rate and regular rhythm.      Heart sounds: Normal heart sounds. No murmur heard.    No gallop.   Pulmonary:      Effort: No respiratory distress.      Breath sounds: Normal breath sounds. No stridor. No wheezing or rales.   Chest:      Chest wall: No tenderness.   Abdominal:       General: Bowel sounds are normal. There is no distension.      Palpations: Abdomen is soft. There is no mass.      Tenderness: There is no abdominal tenderness. There is no guarding or rebound.   Musculoskeletal:         General: Normal range of motion.      Cervical back: Normal range of motion and neck supple.      Comments: Pain in right hip. Good range of motion   Lymphadenopathy:      Cervical: No cervical adenopathy.   Skin:     General: Skin is warm and dry.      Findings: No rash.   Neurological:      Mental Status: She is alert and oriented to person, place, and time.      Cranial Nerves: No cranial nerve deficit.      Deep Tendon Reflexes: Reflexes are normal and symmetric. Reflexes normal.   Psychiatric:         Behavior: Behavior normal.         Thought Content: Thought content normal.         Judgment: Judgment normal.         Assessment:       Problem List Items Addressed This Visit    None     Visit Diagnoses     Annual physical exam    -  Primary          Plan:        Labs: All parameters are normal.    Mammogram: Negative for malignancy    EKG: Normal    Dexa Bone Scan Normal.

## 2022-05-12 NOTE — LETTER
May 12, 2022    Joaquín Bruce  [address]  [place] [address]             [facility] [address]  [address]  [facility] [address]  Phone: [phone] Dear Elodia,      Thank you for allowing me to serve you and perform your Executive Health exam on 5/12/2022. This letter will serve as a brief summary of the physical findings and laboratory/studies performed and recommendations at this time. Your assessment is essentially normal, except for your weight. I hope that you get relief with the use of alleve for a week. If not, call me and we can arrange an appointment in orthopedic clinic.         If you have any questions or concerns, please don't hesitate to call.    Sincerely,        Papa Dela Cruz MD

## 2022-05-12 NOTE — LETTER
May 12, 2022    Joaquín Bruce  [address]  [place] [address]             [facility] [address] [address]  [address]  [facility] [address]  Phone: [phone] Dear  Elodia,      Thank you for allowing me to serve you and perform your Executive Health exam on 5/12/2022. This letter will serve as a brief summary of the physical findings and laboratory/studies performed and recommendations at this time. Except for your weight this is a normal exam. I suggested a week's trial of alleve twice a day and then is not better, call me to arrange an appointment in orthopedic clinic.          If you have any questions or concerns, please don't hesitate to call.    Sincerely,        Papa Dela Cruz MD

## 2022-06-03 ENCOUNTER — PATIENT MESSAGE (OUTPATIENT)
Dept: PULMONOLOGY | Facility: CLINIC | Age: 52
End: 2022-06-03
Payer: COMMERCIAL

## 2022-06-03 ENCOUNTER — TELEPHONE (OUTPATIENT)
Dept: OBSTETRICS AND GYNECOLOGY | Facility: CLINIC | Age: 52
End: 2022-06-03
Payer: COMMERCIAL

## 2022-06-03 DIAGNOSIS — G47.00 INSOMNIA DISORDER, WITH OTHER SLEEP DISORDER, RECURRENT: ICD-10-CM

## 2022-06-03 DIAGNOSIS — G47.8 INSOMNIA DISORDER, WITH OTHER SLEEP DISORDER, RECURRENT: ICD-10-CM

## 2022-06-03 DIAGNOSIS — G47.33 OSA (OBSTRUCTIVE SLEEP APNEA): ICD-10-CM

## 2022-06-03 RX ORDER — ZOLPIDEM TARTRATE 10 MG/1
TABLET ORAL
Qty: 30 TABLET | Refills: 3 | Status: SHIPPED | OUTPATIENT
Start: 2022-06-03 | End: 2022-10-06 | Stop reason: SDUPTHER

## 2022-06-30 ENCOUNTER — OFFICE VISIT (OUTPATIENT)
Dept: OBSTETRICS AND GYNECOLOGY | Facility: CLINIC | Age: 52
End: 2022-06-30
Payer: COMMERCIAL

## 2022-06-30 VITALS — HEIGHT: 65 IN | WEIGHT: 217.13 LBS | BODY MASS INDEX: 36.18 KG/M2

## 2022-06-30 DIAGNOSIS — N89.8 VAGINAL DISCHARGE: ICD-10-CM

## 2022-06-30 DIAGNOSIS — Z01.419 ENCOUNTER FOR GYNECOLOGICAL EXAMINATION WITHOUT ABNORMAL FINDING: Primary | ICD-10-CM

## 2022-06-30 PROCEDURE — 99396 PREV VISIT EST AGE 40-64: CPT | Mod: S$GLB,,, | Performed by: OBSTETRICS & GYNECOLOGY

## 2022-06-30 PROCEDURE — 87481 CANDIDA DNA AMP PROBE: CPT | Mod: 59 | Performed by: OBSTETRICS & GYNECOLOGY

## 2022-06-30 PROCEDURE — 99999 PR PBB SHADOW E&M-EST. PATIENT-LVL III: CPT | Mod: PBBFAC,,, | Performed by: OBSTETRICS & GYNECOLOGY

## 2022-06-30 PROCEDURE — 88175 CYTOPATH C/V AUTO FLUID REDO: CPT | Performed by: OBSTETRICS & GYNECOLOGY

## 2022-06-30 PROCEDURE — 99396 PR PREVENTIVE VISIT,EST,40-64: ICD-10-PCS | Mod: S$GLB,,, | Performed by: OBSTETRICS & GYNECOLOGY

## 2022-06-30 PROCEDURE — 99999 PR PBB SHADOW E&M-EST. PATIENT-LVL III: ICD-10-PCS | Mod: PBBFAC,,, | Performed by: OBSTETRICS & GYNECOLOGY

## 2022-06-30 PROCEDURE — 87801 DETECT AGNT MULT DNA AMPLI: CPT | Performed by: OBSTETRICS & GYNECOLOGY

## 2022-06-30 RX ORDER — TACROLIMUS 1 MG/G
OINTMENT TOPICAL NIGHTLY
COMMUNITY
Start: 2022-01-24 | End: 2023-05-26

## 2022-06-30 RX ORDER — CLOBETASOL PROPIONATE 0.46 MG/ML
SOLUTION TOPICAL
COMMUNITY
Start: 2022-01-24

## 2022-06-30 NOTE — PROGRESS NOTES
"CC: Well woman exam    Joaquín Bruce is a 51 y.o. female  presents for a well woman exam.  She has vaginal discharge.     Past Medical History:   Diagnosis Date    Genital herpes     Hyperlipidemia     Hypertension     Morbid obesity with BMI of 40.0-44.9, adult     ETIENNE (obstructive sleep apnea)        Past Surgical History:   Procedure Laterality Date    abdominal plasty  2017    APPENDECTOMY      open, took one ovary and fallopian tubes as well    COLONOSCOPY N/A 2017    Procedure: COLONOSCOPY - Miralax split prep;  Surgeon: Daja Self MD;  Location: [facility];  Service: Endoscopy;  Laterality: N/A;    GASTRECTOMY      HERNIA REPAIR      OOPHORECTOMY      THIGH LIFT         OB History    Para Term  AB Living   1 0 0   1     SAB IAB Ectopic Multiple Live Births     1            # Outcome Date GA Lbr Jerson/2nd Weight Sex Delivery Anes PTL Lv   1 IAB                Family History   Problem Relation Age of Onset    Hypertension Father     Heart disease Father     Cancer Mother         breast, in remission x 5 years    Breast cancer Mother     No Known Problems Sister     Stroke Maternal Grandmother     Obesity Sister     Obesity Sister     Colon cancer Maternal Uncle     Ovarian cancer Maternal Aunt         maternal grandmother's sister dx age unkown       Social History     Tobacco Use    Smoking status: Never Smoker    Smokeless tobacco: Never Used   Substance Use Topics    Alcohol use: Yes     Comment: OCC    Drug use: No       Ht 5' 5" (1.651 m)   Wt 98.5 kg (217 lb 2.5 oz)   LMP 2022   BMI 36.14 kg/m²     ROS:  GENERAL: Denies weight gain or weight loss. Feeling well overall.   SKIN: Denies rash or lesions.   HEAD: Denies head injury or headache.   NODES: Denies enlarged lymph nodes.   CHEST: Denies chest pain or shortness of breath.   CARDIOVASCULAR: Denies palpitations or left sided chest pain.   ABDOMEN: No abdominal pain, " constipation, diarrhea, nausea, vomiting or rectal bleeding.   URINARY: No frequency, dysuria, hematuria, or burning on urination.  REPRODUCTIVE: See HPI.   BREASTS: The patient performs breast self-examination and denies pain, lumps, or nipple discharge.   HEMATOLOGIC: No easy bruisability or excessive bleeding.  MUSCULOSKELETAL: Denies joint pain or swelling.   NEUROLOGIC: Denies syncope or weakness.   PSYCHIATRIC: Denies depression, anxiety or mood swings.    Physical Exam:    APPEARANCE: Well nourished, well developed, in no acute distress.  AFFECT: WNL, alert and oriented x 3  SKIN: No acne or hirsutism  NECK: Neck symmetric without masses or thyromegaly  NODES: No inguinal, cervical, axillary, or femoral lymph node enlargement  CHEST: Good respiratory effect  ABDOMEN: Soft.  No tenderness or masses.  No hepatosplenomegaly.  No hernias.  BREASTS: Symmetrical, no skin changes or visible lesions.  No palpable masses, nipple discharge bilaterally.  PELVIC: Normal external genitalia without lesions.  Normal hair distribution.  Adequate perineal body, normal urethral meatus.  Vagina moist and well rugated without lesions or discharge.  Cervix pink, without lesions, discharge or tenderness.  No significant cystocele or rectocele.  Bimanual exam shows uterus to be normal size, regular, mobile and nontender.  Adnexa without masses or tenderness.    EXTREMITIES: No edema.    ASSESSMENT AND PLAN  1. Encounter for gynecological examination without abnormal finding  Liquid-Based Pap Smear, Screening   2. Vaginal discharge  Vaginosis Screen by DNA Probe       Vaginitis prevention including :   a. avoiding feminine products such as deoderant soaps, body wash, bubble bath, douches, scented toilet paper, deoderant tampons or pads, baby or feminine wipes, chronic pad use, etc. and   b. avoiding other vulvovaginal irritants such as long hot baths, humidity, tight, synthetic clothing, chlorine and sitting around in wet bathing  suits and   c. wearing cotton underwear, avoiding thong underwear and no underwear to bed and   d. taking showers instead of baths and use a hair dryer on cool setting afterwards to dry and   e.wearing cotton to exercise and shower immediately after exercise and change clothes and   f. using polyurethane condoms without spermicide if sexually active and symptoms are triggered by intercourse;   Diflucan and Mycolog cream use and potential side effects;   -pelvic rest until symptoms resolve.       Patient was counseled today on A.C.S. Pap guidelines and recommendations for yearly pelvic exams, mammograms and monthly self breast exams; to see her PCP for other health maintenance.       Follow up in about 1 year (around 6/30/2023), or if symptoms worsen or fail to improve.

## 2022-07-07 LAB
BACTERIAL VAGINOSIS DNA: POSITIVE
CANDIDA GLABRATA DNA: NEGATIVE
CANDIDA KRUSEI DNA: NEGATIVE
CANDIDA RRNA VAG QL PROBE: NEGATIVE
CLINICAL INFO: NORMAL
CYTO CVX: NORMAL
CYTOLOGIST CVX/VAG CYTO: NORMAL
CYTOLOGIST CVX/VAG CYTO: NORMAL
CYTOLOGY CMNT CVX/VAG CYTO-IMP: NORMAL
CYTOLOGY PAP THIN PREP EXPLANATION: NORMAL
DATE OF PREVIOUS PAP: NORMAL
DATE PREVIOUS BX: NO
GEN CATEG CVX/VAG CYTO-IMP: NORMAL
LMP START DATE: NORMAL
MICROORGANISM CVX/VAG CYTO: NORMAL
PATHOLOGIST CVX/VAG CYTO: NORMAL
SERVICE CMNT-IMP: NORMAL
SPECIMEN SOURCE CVX/VAG CYTO: NORMAL
STAT OF ADQ CVX/VAG CYTO-IMP: NORMAL
T VAGINALIS RRNA GENITAL QL PROBE: NEGATIVE

## 2022-07-08 ENCOUNTER — PATIENT MESSAGE (OUTPATIENT)
Dept: OBSTETRICS AND GYNECOLOGY | Facility: CLINIC | Age: 52
End: 2022-07-08
Payer: COMMERCIAL

## 2022-07-08 RX ORDER — METRONIDAZOLE 500 MG/1
500 TABLET ORAL EVERY 12 HOURS
Qty: 14 TABLET | Refills: 0 | Status: SHIPPED | OUTPATIENT
Start: 2022-07-08 | End: 2022-07-15

## 2022-10-06 ENCOUNTER — PATIENT MESSAGE (OUTPATIENT)
Dept: PULMONOLOGY | Facility: CLINIC | Age: 52
End: 2022-10-06
Payer: COMMERCIAL

## 2022-12-29 ENCOUNTER — PATIENT MESSAGE (OUTPATIENT)
Dept: PULMONOLOGY | Facility: CLINIC | Age: 52
End: 2022-12-29
Payer: COMMERCIAL

## 2023-01-04 DIAGNOSIS — G47.8 INSOMNIA DISORDER, WITH OTHER SLEEP DISORDER, RECURRENT: ICD-10-CM

## 2023-01-04 DIAGNOSIS — G47.33 OSA (OBSTRUCTIVE SLEEP APNEA): ICD-10-CM

## 2023-01-04 DIAGNOSIS — G47.00 INSOMNIA DISORDER, WITH OTHER SLEEP DISORDER, RECURRENT: ICD-10-CM

## 2023-01-04 RX ORDER — ZOLPIDEM TARTRATE 10 MG/1
TABLET ORAL
Qty: 30 TABLET | Refills: 3 | Status: SHIPPED | OUTPATIENT
Start: 2023-01-04 | End: 2024-02-19 | Stop reason: SDUPTHER

## 2023-01-05 ENCOUNTER — PATIENT MESSAGE (OUTPATIENT)
Dept: PULMONOLOGY | Facility: CLINIC | Age: 53
End: 2023-01-05
Payer: COMMERCIAL

## 2023-04-21 DIAGNOSIS — Z00.00 ROUTINE GENERAL MEDICAL EXAMINATION AT A HEALTH CARE FACILITY: Primary | ICD-10-CM

## 2023-04-24 DIAGNOSIS — Z00.00 ROUTINE GENERAL MEDICAL EXAMINATION AT A HEALTH CARE FACILITY: Primary | ICD-10-CM

## 2023-04-25 DIAGNOSIS — Z00.00 ROUTINE MEDICAL EXAM: Primary | ICD-10-CM

## 2023-05-26 ENCOUNTER — CLINICAL SUPPORT (OUTPATIENT)
Dept: INTERNAL MEDICINE | Facility: CLINIC | Age: 53
End: 2023-05-26
Payer: COMMERCIAL

## 2023-05-26 ENCOUNTER — OFFICE VISIT (OUTPATIENT)
Dept: INTERNAL MEDICINE | Facility: CLINIC | Age: 53
End: 2023-05-26
Payer: COMMERCIAL

## 2023-05-26 ENCOUNTER — HOSPITAL ENCOUNTER (OUTPATIENT)
Dept: CARDIOLOGY | Facility: CLINIC | Age: 53
Discharge: HOME | End: 2023-05-26
Payer: COMMERCIAL

## 2023-05-26 ENCOUNTER — HOSPITAL ENCOUNTER (OUTPATIENT)
Dept: RADIOLOGY | Facility: HOSPITAL | Age: 53
Discharge: HOME | End: 2023-05-26
Attending: STUDENT IN AN ORGANIZED HEALTH CARE EDUCATION/TRAINING PROGRAM
Payer: COMMERCIAL

## 2023-05-26 VITALS
DIASTOLIC BLOOD PRESSURE: 70 MMHG | OXYGEN SATURATION: 98 % | WEIGHT: 223.63 LBS | HEART RATE: 64 BPM | HEIGHT: 65 IN | TEMPERATURE: 99 F | BODY MASS INDEX: 37.26 KG/M2 | SYSTOLIC BLOOD PRESSURE: 120 MMHG

## 2023-05-26 DIAGNOSIS — Z00.00 ANNUAL PHYSICAL EXAM: Primary | ICD-10-CM

## 2023-05-26 DIAGNOSIS — Z00.00 ROUTINE MEDICAL EXAM: ICD-10-CM

## 2023-05-26 DIAGNOSIS — E66.09 CLASS 2 OBESITY DUE TO EXCESS CALORIES WITHOUT SERIOUS COMORBIDITY WITH BODY MASS INDEX (BMI) OF 37.0 TO 37.9 IN ADULT: ICD-10-CM

## 2023-05-26 DIAGNOSIS — I10 PRIMARY HYPERTENSION: ICD-10-CM

## 2023-05-26 DIAGNOSIS — Z00.00 HEALTHCARE MAINTENANCE: ICD-10-CM

## 2023-05-26 DIAGNOSIS — Z00.00 ROUTINE GENERAL MEDICAL EXAMINATION AT A HEALTH CARE FACILITY: Primary | ICD-10-CM

## 2023-05-26 DIAGNOSIS — F51.04 CHRONIC INSOMNIA: ICD-10-CM

## 2023-05-26 PROBLEM — B37.9 YEAST INFECTION: Status: RESOLVED | Noted: 2017-07-03 | Resolved: 2023-05-26

## 2023-05-26 LAB
ALBUMIN SERPL BCP-MCNC: 4.1 G/DL (ref 3.5–5.2)
ALP SERPL-CCNC: 43 U/L (ref 55–135)
ALT SERPL W/O P-5'-P-CCNC: 23 U/L (ref 10–44)
ANION GAP SERPL CALC-SCNC: 8 MMOL/L (ref 8–16)
AST SERPL-CCNC: 20 U/L (ref 10–40)
BILIRUB SERPL-MCNC: 0.4 MG/DL (ref 0.1–1)
BUN SERPL-MCNC: 18 MG/DL (ref 6–20)
CALCIUM SERPL-MCNC: 10.2 MG/DL (ref 8.7–10.5)
CHLORIDE SERPL-SCNC: 106 MMOL/L (ref 95–110)
CHOLEST SERPL-MCNC: 181 MG/DL (ref 120–199)
CHOLEST/HDLC SERPL: 3.3 {RATIO} (ref 2–5)
CO2 SERPL-SCNC: 27 MMOL/L (ref 23–29)
CREAT SERPL-MCNC: 0.9 MG/DL (ref 0.5–1.4)
ERYTHROCYTE [DISTWIDTH] IN BLOOD BY AUTOMATED COUNT: 13.6 % (ref 11.5–14.5)
EST. GFR  (NO RACE VARIABLE): >60 ML/MIN/1.73 M^2
ESTIMATED AVG GLUCOSE: 97 MG/DL (ref 68–131)
GLUCOSE SERPL-MCNC: 91 MG/DL (ref 70–110)
HBA1C MFR BLD: 5 % (ref 4–5.6)
HCT VFR BLD AUTO: 40.2 % (ref 37–48.5)
HCV AB SERPL QL IA: NORMAL
HDLC SERPL-MCNC: 55 MG/DL (ref 40–75)
HDLC SERPL: 30.4 % (ref 20–50)
HGB BLD-MCNC: 12.8 G/DL (ref 12–16)
LDLC SERPL CALC-MCNC: 112.6 MG/DL (ref 63–159)
MCH RBC QN AUTO: 27.3 PG (ref 27–31)
MCHC RBC AUTO-ENTMCNC: 31.8 G/DL (ref 32–36)
MCV RBC AUTO: 86 FL (ref 82–98)
NONHDLC SERPL-MCNC: 126 MG/DL
PLATELET # BLD AUTO: 241 K/UL (ref 150–450)
PMV BLD AUTO: 9.5 FL (ref 9.2–12.9)
POTASSIUM SERPL-SCNC: 4.6 MMOL/L (ref 3.5–5.1)
PROT SERPL-MCNC: 7.6 G/DL (ref 6–8.4)
RBC # BLD AUTO: 4.69 M/UL (ref 4–5.4)
SODIUM SERPL-SCNC: 141 MMOL/L (ref 136–145)
TRIGL SERPL-MCNC: 67 MG/DL (ref 30–150)
TSH SERPL DL<=0.005 MIU/L-ACNC: 1.52 UIU/ML (ref 0.4–4)
WBC # BLD AUTO: 6.87 K/UL (ref 3.9–12.7)

## 2023-05-26 PROCEDURE — 83036 HEMOGLOBIN GLYCOSYLATED A1C: CPT | Performed by: STUDENT IN AN ORGANIZED HEALTH CARE EDUCATION/TRAINING PROGRAM

## 2023-05-26 PROCEDURE — 99386 PR PREVENTIVE VISIT,NEW,40-64: ICD-10-PCS | Mod: S$GLB,,, | Performed by: STUDENT IN AN ORGANIZED HEALTH CARE EDUCATION/TRAINING PROGRAM

## 2023-05-26 PROCEDURE — 93005 EKG 12-LEAD: ICD-10-PCS | Mod: S$GLB,,, | Performed by: STUDENT IN AN ORGANIZED HEALTH CARE EDUCATION/TRAINING PROGRAM

## 2023-05-26 PROCEDURE — 99386 PREV VISIT NEW AGE 40-64: CPT | Mod: S$GLB,,, | Performed by: STUDENT IN AN ORGANIZED HEALTH CARE EDUCATION/TRAINING PROGRAM

## 2023-05-26 PROCEDURE — 93010 ELECTROCARDIOGRAM REPORT: CPT | Mod: S$GLB,,, | Performed by: INTERNAL MEDICINE

## 2023-05-26 PROCEDURE — 84443 ASSAY THYROID STIM HORMONE: CPT | Performed by: STUDENT IN AN ORGANIZED HEALTH CARE EDUCATION/TRAINING PROGRAM

## 2023-05-26 PROCEDURE — 99999 PR PBB SHADOW E&M-EST. PATIENT-LVL IV: CPT | Mod: PBBFAC,,, | Performed by: STUDENT IN AN ORGANIZED HEALTH CARE EDUCATION/TRAINING PROGRAM

## 2023-05-26 PROCEDURE — 93010 EKG 12-LEAD: ICD-10-PCS | Mod: S$GLB,,, | Performed by: INTERNAL MEDICINE

## 2023-05-26 PROCEDURE — 93005 ELECTROCARDIOGRAM TRACING: CPT | Mod: S$GLB,,, | Performed by: STUDENT IN AN ORGANIZED HEALTH CARE EDUCATION/TRAINING PROGRAM

## 2023-05-26 PROCEDURE — 77067 SCR MAMMO BI INCL CAD: CPT | Mod: 26,,, | Performed by: RADIOLOGY

## 2023-05-26 PROCEDURE — 99999 PR PBB SHADOW E&M-EST. PATIENT-LVL IV: ICD-10-PCS | Mod: PBBFAC,,, | Performed by: STUDENT IN AN ORGANIZED HEALTH CARE EDUCATION/TRAINING PROGRAM

## 2023-05-26 PROCEDURE — 77067 SCR MAMMO BI INCL CAD: CPT | Mod: TC

## 2023-05-26 PROCEDURE — 80053 COMPREHEN METABOLIC PANEL: CPT | Performed by: STUDENT IN AN ORGANIZED HEALTH CARE EDUCATION/TRAINING PROGRAM

## 2023-05-26 PROCEDURE — 77063 BREAST TOMOSYNTHESIS BI: CPT | Mod: 26,,, | Performed by: RADIOLOGY

## 2023-05-26 PROCEDURE — 77063 MAMMO DIGITAL SCREENING BILAT WITH TOMO: ICD-10-PCS | Mod: 26,,, | Performed by: RADIOLOGY

## 2023-05-26 PROCEDURE — 86803 HEPATITIS C AB TEST: CPT | Performed by: STUDENT IN AN ORGANIZED HEALTH CARE EDUCATION/TRAINING PROGRAM

## 2023-05-26 PROCEDURE — 85027 COMPLETE CBC AUTOMATED: CPT | Performed by: STUDENT IN AN ORGANIZED HEALTH CARE EDUCATION/TRAINING PROGRAM

## 2023-05-26 PROCEDURE — 80061 LIPID PANEL: CPT | Performed by: STUDENT IN AN ORGANIZED HEALTH CARE EDUCATION/TRAINING PROGRAM

## 2023-05-26 PROCEDURE — 77067 MAMMO DIGITAL SCREENING BILAT WITH TOMO: ICD-10-PCS | Mod: 26,,, | Performed by: RADIOLOGY

## 2023-05-26 NOTE — ASSESSMENT & PLAN NOTE
Controlled.  NOT ON MEDICATION  Will continue same management for now.    Recommended to monitor blood pressure regularly, eat a well-balanced diet that's low in salt, DASH diet, enjoy regular physical activity, manage stress, maintain a healthy weight, take medications properly.

## 2023-05-26 NOTE — ASSESSMENT & PLAN NOTE
On chronic use of zolpidem.    Discussed strategies to try to titrate down and aiming to discontinue in the near future.    Will re-evaluate in 3 months.

## 2023-05-26 NOTE — ASSESSMENT & PLAN NOTE
The patient's current BMI is 37.21, weight 223lb  Original weight 270lb before bariatric surgery  This is complicated by HTN, ETIENNE  This has been improving for the past year  It appears that the main barrier to them losing weight is lack of physical activity and poor diet control in the past.  I spent more than 15 minutes discussing the options available to the patient at this time to help with weight loss. This includes pharmacological and behavioral strategies.   Patient is undergoing significant stress due to life events.  We will reassess in 3 months and make a weight loss plan.

## 2023-05-26 NOTE — PROGRESS NOTES
Subjective:       Patient ID: Joaquín Bruce is a 52 y.o. female.    Chief Complaint: [facility] (Hip L & lower back pain & plantar fascitis)    HPI    Joaquín Bruce is a 52 y.o. female , English speaking, with a history of:  HTN  Class 2 Obesity, BMI 37 S/P bariatric surgery / sleeve in 2015, on PPI  H/o Genital herpes  ETIENNE on CPAP  Insomnia / on Ambien 5mg qd since 2013    Patient comes to the clinic a general medical health examination and to establish care.    Patient is currently asymptomatic and has no complaints.    She has a history of morbid obesity, she underwent bariatric surgery.  Initial weight was 270 lb.  She initially lost 70 lb but she has gained 20 lb back.    She is originally from [place] but works for [employer].    She has been seeing a chiropractor for 2 weeks due to back pain and right foot pain    She has a standing prescription for antiviral for herpes flairs.    Patient denies CV symptoms, CP, SOB, palpitations.  Patient denies constitutional symptoms, fever, changes in the urine or stool.    Today's labs:  CBC WNL  CMP WNL  Lipid panel: , HDL 55, , TG 67  A1c: 5.0  TSH WNL 1.520  ECG NSR with sinus arrhythmia.    PMH:  Past Medical History:   Diagnosis Date    Genital herpes     Hyperlipidemia     Hypertension     Morbid obesity with BMI of 40.0-44.9, adult     ETIENNE (obstructive sleep apnea)        PSH:  Past Surgical History:   Procedure Laterality Date    abdominal plasty  2017    APPENDECTOMY  2006    open, took one ovary and fallopian tubes as well    COLONOSCOPY N/A 03/28/2017    Procedure: COLONOSCOPY - Miralax split prep;  Surgeon: Daja Self MD;  Location: [facility];  Service: Endoscopy;  Laterality: N/A;    GASTRECTOMY  2015    HERNIA REPAIR      OOPHORECTOMY      THIGH LIFT  2017       FH:  Family History   Problem Relation Age of Onset    Cancer Mother         breast, in remission x 5 years    Breast cancer Mother     Hypertension Father      Heart disease Father     No Known Problems Sister     Breast cancer Sister 50    Obesity Sister     Obesity Sister     Ovarian cancer Maternal Aunt         maternal grandmother's sister dx age unkown    Colon cancer Maternal Uncle     Stroke Maternal Grandmother        Allergies: Negative  Review of patient's allergies indicates:  No Known Allergies    Meds:    Current Outpatient Medications:     calcium citrate-vitamin D3 500 mg calcium -400 unit Chew, Take 1 tablet by mouth 2 (two) times daily. Takes tablet not chew., Disp: , Rfl:     clindamycin phosphate 1% (CLINDAGEL) 1 % gel, APPLY ON THE SKIN BID, Disp: , Rfl: 3    clobetasoL (TEMOVATE) 0.05 % external solution, Apply topically., Disp: , Rfl:     cyanocobalamin, vitamin B-12, 1,000 mcg Subl, Place 1 tablet under the tongue every other day., Disp: , Rfl:     DENAVIR 1 % cream, APPLY BID TO BUTTOCKS, Disp: , Rfl: 3    multivitamin (THERAGRAN) per tablet, Take 1 tablet by mouth once daily., Disp: , Rfl:     omeprazole (PRILOSEC) 40 MG capsule, Take 1 capsule (40 mg total) by mouth once daily., Disp: 90 capsule, Rfl: 3    valacyclovir (VALTREX) 1000 MG tablet, TK 1 T PO  HS, Disp: , Rfl: 3    zolpidem (AMBIEN) 10 mg Tab, TAKE 1 TABLET BY MOUTH EVERY NIGHT AT BEDTIME AS NEEDED, Disp: 30 tablet, Rfl: 3    Social:  Single, no children  Recently broke up with her boyfriend after a 3 moth relationship.  Originally from [facility]  Works for [employer], as .    Exercise: Tries to stay active  Used to walk up to 4 miles daily  Diet:  She tries to watch what she eats and she is currently on intermittent fasting.  Tobacco: Denies  Alcohol: Denies  Recreational drug use: Denies  Recent travel: Denies    Healthcare Maintenance:  Colonoscopy: 2017  Vaccinations: Pneumonia (no), Zoster 2022, Tetanus 2017  COVID vaccination: completed x 3  Depression screening: PHQ2 score = 0    Annual visit approx. Month: May    ROS  11-point review of systems done. Negative  "except for detailed in the HPI.        Objective:     Vitals:    05/26/23 0844 05/26/23 0900   BP: (!) 140/78 120/70   Pulse: 64    Temp: 98.6 °F (37 °C)    SpO2: 98%    Weight: 101.4 kg (223 lb 9.6 oz)    Height: 5' 5" (1.651 m)            Physical Exam  Vitals and nursing note reviewed.   Constitutional:       Appearance: Normal appearance. She is obese.   HENT:      Head: Normocephalic and atraumatic.      Right Ear: Tympanic membrane normal.      Left Ear: Tympanic membrane normal.      Nose: Nose normal.      Mouth/Throat:      Mouth: Mucous membranes are moist.      Pharynx: Oropharynx is clear.   Eyes:      Extraocular Movements: Extraocular movements intact.      Conjunctiva/sclera: Conjunctivae normal.      Pupils: Pupils are equal, round, and reactive to light.   Cardiovascular:      Rate and Rhythm: Normal rate and regular rhythm.      Pulses: Normal pulses.      Heart sounds: Normal heart sounds.   Pulmonary:      Effort: Pulmonary effort is normal.      Breath sounds: Normal breath sounds.   Abdominal:      General: Bowel sounds are normal. There is no distension.      Palpations: Abdomen is soft.      Tenderness: There is no abdominal tenderness.   Musculoskeletal:         General: Normal range of motion.      Cervical back: Normal range of motion and neck supple.   Skin:     General: Skin is warm.   Neurological:      General: No focal deficit present.      Mental Status: She is alert and oriented to person, place, and time. Mental status is at baseline.   Psychiatric:         Mood and Affect: Mood normal.          Assessment:       1. Routine general medical examination at a health care facility  Assessment & Plan:  Patient is in overall good general health.  Stable medical conditions listed on the PMH.  Labs reviewed and patient notified.        2. Primary hypertension  Assessment & Plan:  Controlled.  NOT ON MEDICATION  Will continue same management for now.    Recommended to monitor blood pressure " regularly, eat a well-balanced diet that's low in salt, DASH diet, enjoy regular physical activity, manage stress, maintain a healthy weight, take medications properly.        3. Chronic insomnia  Assessment & Plan:  On chronic use of zolpidem.    Discussed strategies to try to titrate down and aiming to discontinue in the near future.    Will re-evaluate in 3 months.      4. Class 2 obesity due to excess calories without serious comorbidity with body mass index (BMI) of 37.0 to 37.9 in adult  Assessment & Plan:  The patient's current BMI is 37.21, weight 223lb  Original weight 270lb before bariatric surgery  This is complicated by HTN, ETIENNE  This has been improving for the past year  It appears that the main barrier to them losing weight is lack of physical activity and poor diet control in the past.  I spent more than 15 minutes discussing the options available to the patient at this time to help with weight loss. This includes pharmacological and behavioral strategies.   Patient is undergoing significant stress due to life events.  We will reassess in 3 months and make a weight loss plan.      5. Healthcare maintenance  Assessment & Plan:  Health care maintenance and core gaps reviewed and assessed with patient.  Vaccinations recommended:        Tetanus - 2017       Shingles - n/a       Influenza - n/a       Pneumonia - n/a  Colon cancer screening:   Colonoscopy: 2017  Lifestyle recommendations given.  Physical activity recommended.    Legend: Ordered (O), Declined (D), Current (C)           Plan:       Weight loss recommended.    Continue physical activity.      We will assume his PCP    Recommendations for patient:  Resume physical activity and continue intermittent fasting.      Education provided  Lifestyle recommendations given  AVS generated, explained, and sent to the patient through the patient portal.  RTC in :  3 months for hypertension her blood pressure follow-up.  No labs.  Annual wellness visit in  May, no labs ordered yet.        AMADOU THACKER MD, MPH  Internal Medicine  [facility]  [facility]

## 2023-05-26 NOTE — ASSESSMENT & PLAN NOTE
Health care maintenance and core gaps reviewed and assessed with patient.  Vaccinations recommended:        Tetanus - 2017       Shingles - n/a       Influenza - n/a       Pneumonia - n/a  Colon cancer screening:   Colonoscopy: 2017  Lifestyle recommendations given.  Physical activity recommended.    Legend: Ordered (O), Declined (D), Current (C)

## 2023-07-30 DIAGNOSIS — G47.00 INSOMNIA DISORDER, WITH OTHER SLEEP DISORDER, RECURRENT: ICD-10-CM

## 2023-07-30 DIAGNOSIS — G47.8 INSOMNIA DISORDER, WITH OTHER SLEEP DISORDER, RECURRENT: ICD-10-CM

## 2023-07-30 DIAGNOSIS — G47.33 OSA (OBSTRUCTIVE SLEEP APNEA): ICD-10-CM

## 2023-07-31 ENCOUNTER — PATIENT MESSAGE (OUTPATIENT)
Dept: INTERNAL MEDICINE | Facility: CLINIC | Age: 53
End: 2023-07-31
Payer: COMMERCIAL

## 2023-07-31 RX ORDER — ZOLPIDEM TARTRATE 10 MG/1
TABLET ORAL
Qty: 30 TABLET | Refills: 3 | OUTPATIENT
Start: 2023-07-31

## 2023-08-01 DIAGNOSIS — G47.8 INSOMNIA DISORDER, WITH OTHER SLEEP DISORDER, RECURRENT: ICD-10-CM

## 2023-08-01 DIAGNOSIS — G47.00 INSOMNIA DISORDER, WITH OTHER SLEEP DISORDER, RECURRENT: ICD-10-CM

## 2023-08-01 DIAGNOSIS — G47.33 OSA (OBSTRUCTIVE SLEEP APNEA): ICD-10-CM

## 2023-08-03 RX ORDER — ZOLPIDEM TARTRATE 10 MG/1
TABLET ORAL
Qty: 30 TABLET | Refills: 3 | OUTPATIENT
Start: 2023-08-03

## 2023-08-22 ENCOUNTER — OFFICE VISIT (OUTPATIENT)
Dept: INTERNAL MEDICINE | Facility: CLINIC | Age: 53
End: 2023-08-22
Payer: COMMERCIAL

## 2023-08-22 VITALS
HEART RATE: 68 BPM | WEIGHT: 212.13 LBS | DIASTOLIC BLOOD PRESSURE: 77 MMHG | BODY MASS INDEX: 35.34 KG/M2 | HEIGHT: 65 IN | SYSTOLIC BLOOD PRESSURE: 135 MMHG

## 2023-08-22 DIAGNOSIS — I10 PRIMARY HYPERTENSION: Primary | ICD-10-CM

## 2023-08-22 DIAGNOSIS — E66.09 CLASS 2 OBESITY DUE TO EXCESS CALORIES WITHOUT SERIOUS COMORBIDITY WITH BODY MASS INDEX (BMI) OF 37.0 TO 37.9 IN ADULT: ICD-10-CM

## 2023-08-22 DIAGNOSIS — F51.04 CHRONIC INSOMNIA: ICD-10-CM

## 2023-08-22 DIAGNOSIS — Z00.00 ANNUAL PHYSICAL EXAM: ICD-10-CM

## 2023-08-22 PROCEDURE — 99999 PR PBB SHADOW E&M-EST. PATIENT-LVL IV: CPT | Mod: PBBFAC,,, | Performed by: STUDENT IN AN ORGANIZED HEALTH CARE EDUCATION/TRAINING PROGRAM

## 2023-08-22 PROCEDURE — 99213 OFFICE O/P EST LOW 20 MIN: CPT | Mod: S$GLB,,, | Performed by: STUDENT IN AN ORGANIZED HEALTH CARE EDUCATION/TRAINING PROGRAM

## 2023-08-22 PROCEDURE — 99213 PR OFFICE/OUTPT VISIT, EST, LEVL III, 20-29 MIN: ICD-10-PCS | Mod: S$GLB,,, | Performed by: STUDENT IN AN ORGANIZED HEALTH CARE EDUCATION/TRAINING PROGRAM

## 2023-08-22 PROCEDURE — 99999 PR PBB SHADOW E&M-EST. PATIENT-LVL IV: ICD-10-PCS | Mod: PBBFAC,,, | Performed by: STUDENT IN AN ORGANIZED HEALTH CARE EDUCATION/TRAINING PROGRAM

## 2023-08-22 RX ORDER — LISINOPRIL 10 MG/1
10 TABLET ORAL DAILY
Qty: 90 TABLET | Refills: 3 | Status: SHIPPED | OUTPATIENT
Start: 2023-08-22 | End: 2024-08-21

## 2023-08-22 RX ORDER — TRAZODONE HYDROCHLORIDE 50 MG/1
25 TABLET ORAL NIGHTLY PRN
Qty: 15 TABLET | Refills: 11 | Status: SHIPPED | OUTPATIENT
Start: 2023-08-22 | End: 2024-08-21

## 2023-08-22 NOTE — PROGRESS NOTES
"Subjective:       Patient ID: Joaquín Bruce is a 53 y.o. female.    Chief Complaint: Follow-up    Follow-up        Joaquín Bruce is a 53 y.o. female , English speaking, with a history of:    HTN  Class 2 Obesity, BMI 37 S/P bariatric surgery / sleeve in 2015, on PPI  H/o Genital herpes  ETIENNE on CPAP  Insomnia / on Ambien 5mg qd since 2013    [Local Patient]  Originally from [facility]  Lives in [place]: [place]      Patient comes to the clinic for a blood pressure follow up    She continues to be asymptomatic.    She has lost about 10 lb since the last time I saw her with physical activity and intermittent fasting.  She is committed to continue to lose more weight.    She completed her blood pressure log but she forgot it today, she says her blood pressure readings have been between 120-140 and 80-90.  Most of the time her blood pressure is within normal ranges but she does have some spikes.    She has been asymptomatic and she denies chest pain, palpitations, shortness of breath.      She continues to present daily insomnia, and she uses daily zolpidem-tablet every night.    She also takes melatonin.    She does not want to come off the zolpidem.      She continues to have communication with her ex-boyfriend, however they are not in a relationship.    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    Healthcare Maintenance:  Colonoscopy: 2017  Vaccinations: Pneumonia (no), Zoster 2022, Tetanus 2017  COVID vaccination: completed x 3  Depression screening: PHQ2 score = 0     Annual visit approx. Month: May    ROS  11-point review of systems done. Negative except for detailed in the HPI.        Objective:      Vitals:    08/22/23 1503 08/22/23 1508   BP: (!) 143/82 135/77  Comment: right arm   Pulse: 70 68   Weight: 96.2 kg (212 lb 1.6 oz)    Height: 5' 5" (1.651 m)          Physical Exam  Vitals and nursing note reviewed.   Constitutional:       Appearance: Normal appearance. She is " obese.   HENT:      Head: Normocephalic and atraumatic.      Right Ear: Tympanic membrane normal.      Left Ear: Tympanic membrane normal.      Nose: Nose normal.      Mouth/Throat:      Mouth: Mucous membranes are moist.      Pharynx: Oropharynx is clear.   Eyes:      Extraocular Movements: Extraocular movements intact.      Conjunctiva/sclera: Conjunctivae normal.      Pupils: Pupils are equal, round, and reactive to light.   Cardiovascular:      Rate and Rhythm: Normal rate and regular rhythm.      Pulses: Normal pulses.      Heart sounds: Normal heart sounds.   Pulmonary:      Effort: Pulmonary effort is normal.      Breath sounds: Normal breath sounds.   Abdominal:      General: Bowel sounds are normal. There is no distension.      Palpations: Abdomen is soft.      Tenderness: There is no abdominal tenderness.   Musculoskeletal:         General: Normal range of motion.      Cervical back: Normal range of motion and neck supple.   Skin:     General: Skin is warm.   Neurological:      General: No focal deficit present.      Mental Status: She is alert and oriented to person, place, and time. Mental status is at baseline.   Psychiatric:         Mood and Affect: Mood normal.            Assessment:       1. Primary hypertension  Assessment & Plan:  Controlled however having spikes of BP at home  Will start patient on ACEi  Lisinopril 10 mg qd    Recommended to monitor blood pressure regularly, eat a well-balanced diet that's low in salt, DASH diet, enjoy regular physical activity, manage stress, maintain a healthy weight, take medications properly.      Orders:  -     lisinopriL 10 MG tablet; Take 1 tablet (10 mg total) by mouth once daily.  Dispense: 90 tablet; Refill: 3    2. Chronic insomnia  Assessment & Plan:  We discussed again the use of zolpidem for her insomnia.  I have highly encouraged for her to use other methods to induce her sleep.    I am going to try to titrate down zolpidem, this has been discussed  with the patient, patient preferred not to come off the zolpidem.    I will be starting patient on trazodone 25 mg q.h.s. p.r.n., alternate with zolpidem, the goal is to come off of possible.    Orders:  -     traZODone (DESYREL) 50 MG tablet; Take 0.5 tablets (25 mg total) by mouth nightly as needed for Insomnia.  Dispense: 15 tablet; Refill: 11    3. Class 2 obesity due to excess calories without serious comorbidity with body mass index (BMI) of 37.0 to 37.9 in adult  Assessment & Plan:  The patient's current BMI is 35.3, weight 212 lb  Wt Readings from Last 3 Encounters:   08/22/23 96.2 kg (212 lb 1.6 oz)   05/26/23 101.4 kg (223 lb 9.6 oz)   06/30/22 98.5 kg (217 lb 2.5 oz)     Improving  Patient is doing intermittent fasting and physical activity  I have praised her for her achievements and encouraged her to continue with the current plan        4. Annual physical exam  -     CBC Auto Differential; Future; Expected date: 08/22/2023  -     Comprehensive Metabolic Panel; Future; Expected date: 08/22/2023  -     Hemoglobin A1C; Future; Expected date: 08/22/2023  -     Urinalysis; Future; Expected date: 08/22/2023  -     Lipid Panel; Future; Expected date: 08/22/2023       Plan:         Trazodone 25 mg qhs  Started patient on lisinopril 10 mg qd  Continue weight loss and physical activity      Education provided  Lifestyle recommendations given  AVS printed, explained, and given to the patient.  RTC in : May for AWV            AMADOU THACKER MD, MPH  Internal Medicine  [facility]  [facility]

## 2023-08-22 NOTE — ASSESSMENT & PLAN NOTE
The patient's current BMI is 35.3, weight 212 lb  Wt Readings from Last 3 Encounters:   08/22/23 96.2 kg (212 lb 1.6 oz)   05/26/23 101.4 kg (223 lb 9.6 oz)   06/30/22 98.5 kg (217 lb 2.5 oz)     Improving  Patient is doing intermittent fasting and physical activity  I have praised her for her achievements and encouraged her to continue with the current plan

## 2023-08-22 NOTE — ASSESSMENT & PLAN NOTE
We discussed again the use of zolpidem for her insomnia.  I have highly encouraged for her to use other methods to induce her sleep.    I am going to try to titrate down zolpidem, this has been discussed with the patient, patient preferred not to come off the zolpidem.    I will be starting patient on trazodone 25 mg q.h.s. p.r.n., alternate with zolpidem, the goal is to come off of possible.

## 2023-08-22 NOTE — ASSESSMENT & PLAN NOTE
Controlled however having spikes of BP at home  Will start patient on ACEi  Lisinopril 10 mg qd    Recommended to monitor blood pressure regularly, eat a well-balanced diet that's low in salt, DASH diet, enjoy regular physical activity, manage stress, maintain a healthy weight, take medications properly.

## 2023-08-28 PROBLEM — Z00.00 HEALTHCARE MAINTENANCE: Status: RESOLVED | Noted: 2023-05-26 | Resolved: 2023-08-28

## 2023-08-28 PROBLEM — Z00.00 ROUTINE GENERAL MEDICAL EXAMINATION AT A HEALTH CARE FACILITY: Status: RESOLVED | Noted: 2023-05-26 | Resolved: 2023-08-28

## 2023-09-07 DIAGNOSIS — Z98.84 STATUS POST LAPAROSCOPIC SLEEVE GASTRECTOMY: ICD-10-CM

## 2023-09-07 DIAGNOSIS — K21.9 GASTROESOPHAGEAL REFLUX DISEASE: ICD-10-CM

## 2023-09-07 RX ORDER — OMEPRAZOLE 40 MG/1
40 CAPSULE, DELAYED RELEASE ORAL
Qty: 90 CAPSULE | Refills: 3 | OUTPATIENT
Start: 2023-09-07

## 2023-09-18 DIAGNOSIS — K21.9 GASTROESOPHAGEAL REFLUX DISEASE: ICD-10-CM

## 2023-09-18 DIAGNOSIS — Z98.84 STATUS POST LAPAROSCOPIC SLEEVE GASTRECTOMY: ICD-10-CM

## 2023-09-18 RX ORDER — OMEPRAZOLE 40 MG/1
40 CAPSULE, DELAYED RELEASE ORAL DAILY
Qty: 90 CAPSULE | Refills: 3 | Status: SHIPPED | OUTPATIENT
Start: 2023-09-18

## 2023-09-22 ENCOUNTER — OFFICE VISIT (OUTPATIENT)
Dept: OBSTETRICS AND GYNECOLOGY | Facility: CLINIC | Age: 53
End: 2023-09-22
Payer: COMMERCIAL

## 2023-09-22 VITALS
WEIGHT: 214.31 LBS | BODY MASS INDEX: 35.71 KG/M2 | SYSTOLIC BLOOD PRESSURE: 128 MMHG | DIASTOLIC BLOOD PRESSURE: 82 MMHG | HEIGHT: 65 IN

## 2023-09-22 DIAGNOSIS — Z78.0 POSTMENOPAUSAL: ICD-10-CM

## 2023-09-22 DIAGNOSIS — Z01.419 ENCOUNTER FOR GYNECOLOGICAL EXAMINATION WITHOUT ABNORMAL FINDING: Primary | ICD-10-CM

## 2023-09-22 PROCEDURE — 99396 PR PREVENTIVE VISIT,EST,40-64: ICD-10-PCS | Mod: S$GLB,,, | Performed by: OBSTETRICS & GYNECOLOGY

## 2023-09-22 PROCEDURE — 99999 PR PBB SHADOW E&M-EST. PATIENT-LVL III: CPT | Mod: PBBFAC,,, | Performed by: OBSTETRICS & GYNECOLOGY

## 2023-09-22 PROCEDURE — 99999 PR PBB SHADOW E&M-EST. PATIENT-LVL III: ICD-10-PCS | Mod: PBBFAC,,, | Performed by: OBSTETRICS & GYNECOLOGY

## 2023-09-22 PROCEDURE — 99396 PREV VISIT EST AGE 40-64: CPT | Mod: S$GLB,,, | Performed by: OBSTETRICS & GYNECOLOGY

## 2023-09-22 NOTE — PROGRESS NOTES
CC: Well woman exam    Joaquín Bruce is a 53 y.o. female  presents for a well woman exam.  LMP: No LMP recorded..    She has not had bleeding since Dec.  Minimal menopausal symptoms.      Past Medical History:   Diagnosis Date    Genital herpes     Hyperlipidemia     Hypertension     Morbid obesity with BMI of 40.0-44.9, adult     ETIENNE (obstructive sleep apnea)      Past Surgical History:   Procedure Laterality Date    abdominal plasty  2017    APPENDECTOMY      open, took one ovary and fallopian tubes as well    COLONOSCOPY N/A 2017    Procedure: COLONOSCOPY - Miralax split prep;  Surgeon: Daja Self MD;  Location: [facility];  Service: Endoscopy;  Laterality: N/A;    GASTRECTOMY      HERNIA REPAIR      OOPHORECTOMY      THIGH LIFT       Social History     Socioeconomic History    Marital status: Single   Tobacco Use    Smoking status: Never    Smokeless tobacco: Never   Substance and Sexual Activity    Alcohol use: Yes     Comment: OCC    Drug use: No    Sexual activity: Yes     Partners: Male     Birth control/protection: None   Social History Narrative    Works full time as an : [employer].  Single. Lives alone.  Good support system with family and friends.     Social Determinants of Health     Financial Resource Strain: Low Risk  (2023)    Overall Financial Resource Strain (CARDIA)     Difficulty of Paying Living Expenses: Not hard at all   Food Insecurity: No Food Insecurity (2023)    Hunger Vital Sign     Worried About Running Out of Food in the Last Year: Never true     Ran Out of Food in the Last Year: Never true   Transportation Needs: No Transportation Needs (2023)    PRAPARE - Transportation     Lack of Transportation (Medical): No     Lack of Transportation (Non-Medical): No   Physical Activity: Insufficiently Active (2023)    Exercise Vital Sign     Days of Exercise per Week: 3 days     Minutes of Exercise per Session: 30 min  "  Stress: No Stress Concern Present (2023)    [employer] - Occupational Stress Questionnaire     Feeling of Stress : Not at all   Social Connections: Unknown (2023)    Social Connection and Isolation Panel [NHANES]     Frequency of Communication with Friends and Family: More than three times a week     Frequency of Social Gatherings with Friends and Family: More than three times a week     Attends [employer] Services: More than 4 times per year     Active Member of Clubs or Organizations: Yes     Attends Club or Organization Meetings: 1 to 4 times per year   Housing Stability: Low Risk  (2023)    Housing Stability Vital Sign     Unable to Pay for Housing in the Last Year: No     Number of Places Lived in the Last Year: 1     Unstable Housing in the Last Year: No     Family History   Problem Relation Age of Onset    Cancer Mother         breast, in remission x 5 years    Breast cancer Mother     Hypertension Father     Heart disease Father     No Known Problems Sister     Breast cancer Sister 50    Obesity Sister     Obesity Sister     Ovarian cancer Maternal Aunt         maternal grandmother's sister dx age unkown    Colon cancer Maternal Uncle     Stroke Maternal Grandmother      OB History          1    Para   0    Term   0            AB   1    Living             SAB        IAB   1    Ectopic        Multiple        Live Births                     /82   Ht 5' 5" (1.651 m)   Wt 97.2 kg (214 lb 4.6 oz)   BMI 35.66 kg/m²       ROS:    ROS:  GENERAL: Denies weight gain or weight loss. Feeling well overall.   SKIN: Denies rash or lesions.   HEAD: Denies head injury or headache.   NODES: Denies enlarged lymph nodes.   CHEST: Denies chest pain or shortness of breath.   CARDIOVASCULAR: Denies palpitations or left sided chest pain.   ABDOMEN: No abdominal pain, constipation, diarrhea, nausea, vomiting or rectal bleeding.   URINARY: No frequency, dysuria, " hematuria, or burning on urination.  REPRODUCTIVE: See HPI.   BREASTS: The patient performs breast self-examination and denies pain, lumps, or nipple discharge.   HEMATOLOGIC: No easy bruisability or excessive bleeding.   MUSCULOSKELETAL: Denies joint pain or swelling.   NEUROLOGIC: Denies syncope or weakness.   PSYCHIATRIC: Denies depression, anxiety or mood swings.    PHYSICAL EXAM:    APPEARANCE: Well nourished, well developed, in no acute distress.  AFFECT: WNL, alert and oriented x 3  SKIN: No acne or hirsutism  NECK: Neck symmetric without masses or thyromegaly  NODES: No inguinal, cervical, axillary, or femoral lymph node enlargement  CHEST: Good respiratory effect  ABDOMEN: Soft.  No tenderness or masses.  No hepatosplenomegaly.  No hernias.  BREASTS: Symmetrical, no skin changes or visible lesions.  No palpable masses, nipple discharge bilaterally.  PELVIC: Normal external genitalia without lesions.  Normal hair distribution.  Adequate perineal body, normal urethral meatus.  Vagina dry and well rugated without lesions or discharge.  Cervix pink, without lesions, discharge or tenderness.  No significant cystocele or rectocele.  Bimanual exam shows uterus to be normal size, regular, mobile and nontender.  Adnexa without masses or tenderness.    RECTAL: Rectovaginal exam confirms above with normal sphincter tone, no masses.  EXTREMITIES: No edema.    Diagnosis        ICD-10-CM ICD-9-CM    1. Encounter for gynecological examination without abnormal finding  Z01.419 V72.31       2. Postmenopausal  Z78.0 V49.81         Menopausal supplements discussed    Patient was counseled today on A.C.S. Pap guidelines and recommendations for yearly pelvic exams, mammograms and monthly self breast exams; to see her PCP for other health maintenance.       Follow up in about 1 year (around 9/22/2024), or if symptoms worsen or fail to improve.

## 2023-11-20 RX ORDER — SCOLOPAMINE TRANSDERMAL SYSTEM 1 MG/1
1 PATCH, EXTENDED RELEASE TRANSDERMAL
COMMUNITY
End: 2023-11-20 | Stop reason: SDUPTHER

## 2023-11-20 RX ORDER — SCOLOPAMINE TRANSDERMAL SYSTEM 1 MG/1
1 PATCH, EXTENDED RELEASE TRANSDERMAL
Qty: 3 PATCH | Refills: 0 | Status: SHIPPED | OUTPATIENT
Start: 2023-11-20

## 2023-12-14 ENCOUNTER — PATIENT MESSAGE (OUTPATIENT)
Dept: INTERNAL MEDICINE | Facility: CLINIC | Age: 53
End: 2023-12-14
Payer: COMMERCIAL

## 2024-01-25 ENCOUNTER — OFFICE VISIT (OUTPATIENT)
Dept: SLEEP MEDICINE | Facility: CLINIC | Age: 54
End: 2024-01-25
Payer: COMMERCIAL

## 2024-01-25 ENCOUNTER — PATIENT MESSAGE (OUTPATIENT)
Dept: SLEEP MEDICINE | Facility: CLINIC | Age: 54
End: 2024-01-25

## 2024-01-25 DIAGNOSIS — G47.33 OSA (OBSTRUCTIVE SLEEP APNEA): Primary | ICD-10-CM

## 2024-01-25 PROCEDURE — 99204 OFFICE O/P NEW MOD 45 MIN: CPT | Mod: 95,,, | Performed by: PSYCHIATRY & NEUROLOGY

## 2024-01-25 NOTE — PATIENT INSTRUCTIONS
Please call Resp[employer]ics Ram directly at [phone] and website is Please call [employer] Ram directly at [phone] and website is [email or web address]   to register your machine for recall.         For now, you can continue using your machine with an  Inline antibacterial filter  for CPAP/BPAP that you can either purchase through [facility] (call [phone]), or at  [employer] or at [email or web address]:       t

## 2024-01-25 NOTE — PROGRESS NOTES
The patient location is: home  The chief complaint leading to consultation is: sleep disorder  Visit type: audiovisual  Each patient to whom he or she provides medical services by telemedicine is:  (1) informed of the relationship between the physician and patient and the respective role of any other health care provider with respect to management of the patient; and (2) notified that he or she may decline to receive medical services by telemedicine and may withdraw from such care at any time.  31 minutes of total time spent on the encounter, which includes face to face time and non-face to face time preparing to see the patient (eg, review of tests), Obtaining and/or reviewing separately obtained history, Documenting clinical information in the electronic or other health record, Independently interpreting results (not separately reported) and communicating results to the patient/family/caregiver, or Care coordination (not separately reported).          Joaquín Bruce is a 53 y.o. female is here to be evaluated for a sleep disorder; referred by Fallon Sidhu MD.      She was previously seen by Dr. Mcmahan for ETIENNE in [facility] in 2017 - looking  to re-establish  sleep medicine care.   The patient is coming to discuss [facility] devices  recall, but mainly to request CPAP supplies  She is using an old System One machine from [facility].  Did not register it for recall with [facility].   Needs CPAP supplies prescription.       Benefiting from CPAP use in terms of sleep continuity and daytime sleepiness. Compliant. No break through snoring. No oronasal dryness  Denied symptoms concerning for parasomnia and narcolepsy      CPAP - download will be uploaded by the patient to[employer].  It was shown to me on the screen during this video conference.  Reflects settings at    7 cm H2O (straight CPAP)  AHI 0.9  Large leak time - 9 seconds         1/25/2024    11:00 AM   EPWORTH SLEEPINESS SCALE TOTAL SCORE    Total score  9       DME: [facility]  got machine in 2015; recently saw [facility].    Tubing 22 mm     Sleep studies:   2015:  Split Night study The overall AHI was 17.1 with an oxygen linda of 84.0%. 10 cm H2O          1/25/2024    11:00 AM   EPWORTH SLEEPINESS SCALE TOTAL SCORE    Total score 9             1/25/2024    11:00 AM   EPWORTH SLEEPINESS SCALE   Sitting and reading 2   Watching TV 1   Sitting, inactive in a public place (e.g. a theatre or a meeting) 1   As a passenger in a car for an hour without a break 2   Lying down to rest in the afternoon when circumstances permit 2   Sitting and talking to someone 0   Sitting quietly after a lunch without alcohol 1   In a car, while stopped for a few minutes in traffic 0   Total score 9             1/25/2024    11:00 AM   EPWORTH SLEEPINESS SCALE   Sitting and reading 2   Watching TV 1   Sitting, inactive in a public place (e.g. a theatre or a meeting) 1   As a passenger in a car for an hour without a break 2   Lying down to rest in the afternoon when circumstances permit 2   Sitting and talking to someone 0   Sitting quietly after a lunch without alcohol 1   In a car, while stopped for a few minutes in traffic 0   Total score 9         1/25/2024    11:05 AM   Sleep Clinic New Patient   What time do you go to bed on a week day? (Give a range) 9-10pm   What time do you go to bed on a day off? (Give a range) 12am   How long does it take you to fall asleep? (Give a range) 5min   On average, how many times per night do you wake up? 1   How long does it take you to fall back into sleep? (Give a range) 3min   What time do you wake up to start your day on a week day? (Give a range) 6am   What time do you wake up to start your day on a day off? (Give a range) 8-9am   What time do you get out of bed? (Give a range) 9am   On average, how many hours do you sleep? 7hrs   On average, how many naps do you take per day? 0   Rate your sleep quality from 0 to 5 (0-poor,  5-great). 3   Have you experienced:  Weight gain?   How much weight have you lost or gained (in lbs.) in the last year? 10lbs   On average, how many times per night do you go to the bathroom?  0   Have you ever had a sleep study/CPAP machine/surgery for sleep apnea? Yes   Have you ever had a CPAP machine for sleep apnea? Yes   Have you ever had surgery for sleep apnea? No           1/25/2024    11:05 AM   Sleep Clinic ROS    Difficulty breathing through the nose?  No   Sore throat or dry mouth in the morning? Sometimes   Irregular or very fast heart beat?  No   Shortness of breath?  No   Acid reflux? Yes   Body aches and pains?  Yes   Morning headaches? No   Dizziness? No   Mood changes?  No   Do you exercise?  Sometimes   Do you feel like moving your legs a lot?  No       DME:         PAST MEDICAL HISTORY:    Active Ambulatory Problems     Diagnosis Date Noted    Hypertension     Hyperlipidemia     ETIENNE (obstructive sleep apnea)     Obesity, Class II, BMI 35-39.9, with comorbidity 12/30/2015    S/P laparoscopic sleeve gastrectomy 12/30/2015    Class 2 obesity due to excess calories without serious comorbidity with body mass index (BMI) of 37.0 to 37.9 in adult 12/09/2016    Screening for malignant neoplasm of colon 03/28/2017    Abdominal pannus 07/03/2017    Chronic insomnia 05/26/2023     Resolved Ambulatory Problems     Diagnosis Date Noted    Morbid obesity with BMI of 40.0-44.9, adult     Obesity (BMI 30-39.9) 03/07/2016    BMI 29.0-29.9,adult 06/10/2016    Yeast infection 07/03/2017    Routine general medical examination at a health care facility 05/26/2023    Healthcare maintenance 05/26/2023     Past Medical History:   Diagnosis Date    Genital herpes                 PAST SURGICAL HISTORY:    Past Surgical History:   Procedure Laterality Date    abdominal plasty  2017    APPENDECTOMY  2006    open, took one ovary and fallopian tubes as well    COLONOSCOPY N/A 03/28/2017    Procedure: COLONOSCOPY - Miralax  split prep;  Surgeon: Daja Self MD;  Location: [facility];  Service: Endoscopy;  Laterality: N/A;    GASTRECTOMY  2015    HERNIA REPAIR      OOPHORECTOMY      THIGH LIFT  2017         FAMILY HISTORY:                Family History   Problem Relation Age of Onset    Cancer Mother         breast, in remission x 5 years    Breast cancer Mother     Hypertension Father     Heart disease Father     No Known Problems Sister     Breast cancer Sister 50    Obesity Sister     Obesity Sister     Ovarian cancer Maternal Aunt         maternal grandmother's sister dx age unkown    Colon cancer Maternal Uncle     Stroke Maternal Grandmother        SOCIAL HISTORY:          Tobacco:   Social History     Tobacco Use   Smoking Status Never   Smokeless Tobacco Never       alcohol use:    Social History     Substance and Sexual Activity   Alcohol Use Yes    Comment: OCC                   ALLERGIES:  Review of patient's allergies indicates:  No Known Allergies    CURRENT MEDICATIONS:    Current Outpatient Medications   Medication Sig Dispense Refill    calcium citrate-vitamin D3 500 mg calcium -400 unit Chew Take 1 tablet by mouth 2 (two) times daily. Takes tablet not chew.      clindamycin phosphate 1% (CLINDAGEL) 1 % gel APPLY ON THE SKIN BID  3    clobetasoL (TEMOVATE) 0.05 % external solution Apply topically.      cyanocobalamin, vitamin B-12, 1,000 mcg Subl Place 1 tablet under the tongue every other day.      DENAVIR 1 % cream APPLY BID TO BUTTOCKS  3    lisinopriL 10 MG tablet Take 1 tablet (10 mg total) by mouth once daily. 90 tablet 3    multivitamin (THERAGRAN) per tablet Take 1 tablet by mouth once daily.      omeprazole (PRILOSEC) 40 MG capsule Take 1 capsule (40 mg total) by mouth once daily. 90 capsule 3    scopolamine (TRANSDERM-SCOP) 1.3-1.5 mg (1 mg over 3 days) Place 1 patch onto the skin every 72 hours. 3 patch 0    traZODone (DESYREL) 50 MG tablet Take 0.5 tablets (25 mg total) by mouth nightly as needed for  Insomnia. 15 tablet 11    valacyclovir (VALTREX) 1000 MG tablet TK 1 T PO  HS  3    zolpidem (AMBIEN) 10 mg Tab TAKE 1 TABLET BY MOUTH EVERY NIGHT AT BEDTIME AS NEEDED 30 tablet 3     No current facility-administered medications for this visit.                    PHYSICAL EXAM:  There were no vitals taken for this visit.          ASSESSMENT:    1. ETIENNE. Benefiting from CPAP use in terms of sleep continuity and daytime sleepiness. Compliant with CPAP use. Good AHI control as per recent download in [facility].         PLAN:    The patient will upload her CPAP download to patient files.    Respironics positive airway pressure devices recall was discussed with the patient. Joaquín Bruce states that she has not experienced episodes of asthma-like symptoms, headache, nausea or dizziness associated with his Respironics device use. she has   not used Soclean or another ozone clener. She  has not  registered her device with [employer].    She really likes her System one and is reluctant to replace it with Ram or upgrade it    Ram contact information and instructions have been provided.    Bacterial filter has been mentioned to the patient with the mention that it may protect from the particles, but not the VOC; the patient has expressed understanding.     I have placed an order to replace her CPAP supplies - she will reach out to [facility] in [place].    Strongly recommended to upgrade her machine via Ram recall route or replace it under insurance coverage.       During our discussion today, we talked about the etiology of  ETIENNE as well as the potential ramifications of untreated sleep apnea, which could include daytime sleepiness, hypertension, heart disease and/or stroke.  We discussed potential treatment options, which could include weight loss, body positioning, continuous positive airway pressure (CPAP), or referral for surgical consideration. Meanwhile, she  is urged to avoid supine sleep,  weight gain and alcoholic beverages since all of these can worsen ETIENNE.     The patient was given open opportunity to ask questions and/or express concerns about treatment plan. Two point patient identifier confirmed.       Precautions: The patient was advised to abstain from driving should he feel sleepy or drowsy.    Follow up: MD PRN    ESS ([place] Sleepiness Scale) and other sleep medicine related questionnaires were reviewed with the patient.      46-minute visit. >50% spent counseling patient and coordination of care.  The patient was  cautioned against drowsy driving.

## 2024-02-19 ENCOUNTER — PATIENT MESSAGE (OUTPATIENT)
Dept: INTERNAL MEDICINE | Facility: CLINIC | Age: 54
End: 2024-02-19
Payer: COMMERCIAL

## 2024-02-19 DIAGNOSIS — G47.33 OSA (OBSTRUCTIVE SLEEP APNEA): ICD-10-CM

## 2024-02-19 DIAGNOSIS — G47.00 INSOMNIA DISORDER, WITH OTHER SLEEP DISORDER, RECURRENT: ICD-10-CM

## 2024-02-19 DIAGNOSIS — G47.8 INSOMNIA DISORDER, WITH OTHER SLEEP DISORDER, RECURRENT: ICD-10-CM

## 2024-02-19 RX ORDER — ZOLPIDEM TARTRATE 10 MG/1
TABLET ORAL
Qty: 30 TABLET | Refills: 3 | OUTPATIENT
Start: 2024-02-19 | End: 2024-02-19 | Stop reason: SDUPTHER

## 2024-02-19 RX ORDER — ZOLPIDEM TARTRATE 10 MG/1
TABLET ORAL
Qty: 30 TABLET | Refills: 3 | Status: SHIPPED | OUTPATIENT
Start: 2024-02-19

## 2024-04-19 DIAGNOSIS — Z00.00 ROUTINE MEDICAL EXAM: Primary | ICD-10-CM

## 2024-04-19 DIAGNOSIS — Z00.00 ROUTINE GENERAL MEDICAL EXAMINATION AT A HEALTH CARE FACILITY: Primary | ICD-10-CM

## 2024-05-23 ENCOUNTER — OFFICE VISIT (OUTPATIENT)
Dept: INTERNAL MEDICINE | Facility: CLINIC | Age: 54
End: 2024-05-23
Payer: COMMERCIAL

## 2024-05-23 ENCOUNTER — HOSPITAL ENCOUNTER (OUTPATIENT)
Dept: RADIOLOGY | Facility: HOSPITAL | Age: 54
Discharge: HOME | End: 2024-05-23
Attending: STUDENT IN AN ORGANIZED HEALTH CARE EDUCATION/TRAINING PROGRAM
Payer: COMMERCIAL

## 2024-05-23 ENCOUNTER — CLINICAL SUPPORT (OUTPATIENT)
Dept: INTERNAL MEDICINE | Facility: CLINIC | Age: 54
End: 2024-05-23
Attending: STUDENT IN AN ORGANIZED HEALTH CARE EDUCATION/TRAINING PROGRAM
Payer: COMMERCIAL

## 2024-05-23 ENCOUNTER — HOSPITAL ENCOUNTER (OUTPATIENT)
Dept: CARDIOLOGY | Facility: CLINIC | Age: 54
Discharge: HOME | End: 2024-05-23
Payer: COMMERCIAL

## 2024-05-23 VITALS
DIASTOLIC BLOOD PRESSURE: 67 MMHG | BODY MASS INDEX: 35.74 KG/M2 | WEIGHT: 214.5 LBS | HEIGHT: 65 IN | HEART RATE: 62 BPM | SYSTOLIC BLOOD PRESSURE: 133 MMHG

## 2024-05-23 DIAGNOSIS — G47.33 OSA (OBSTRUCTIVE SLEEP APNEA): ICD-10-CM

## 2024-05-23 DIAGNOSIS — K21.9 GASTROESOPHAGEAL REFLUX DISEASE WITHOUT ESOPHAGITIS: ICD-10-CM

## 2024-05-23 DIAGNOSIS — Z98.84 STATUS POST LAPAROSCOPIC SLEEVE GASTRECTOMY: ICD-10-CM

## 2024-05-23 DIAGNOSIS — Z00.00 ANNUAL PHYSICAL EXAM: Primary | ICD-10-CM

## 2024-05-23 DIAGNOSIS — Z00.00 HEALTHCARE MAINTENANCE: ICD-10-CM

## 2024-05-23 DIAGNOSIS — E66.09 CLASS 2 OBESITY DUE TO EXCESS CALORIES WITHOUT SERIOUS COMORBIDITY WITH BODY MASS INDEX (BMI) OF 35.0 TO 35.9 IN ADULT: ICD-10-CM

## 2024-05-23 DIAGNOSIS — E78.5 HYPERLIPIDEMIA, UNSPECIFIED HYPERLIPIDEMIA TYPE: ICD-10-CM

## 2024-05-23 DIAGNOSIS — I10 PRIMARY HYPERTENSION: ICD-10-CM

## 2024-05-23 DIAGNOSIS — Z00.00 ROUTINE MEDICAL EXAM: ICD-10-CM

## 2024-05-23 DIAGNOSIS — Z71.89 ACP (ADVANCE CARE PLANNING): ICD-10-CM

## 2024-05-23 DIAGNOSIS — Z00.00 ROUTINE GENERAL MEDICAL EXAMINATION AT A HEALTH CARE FACILITY: ICD-10-CM

## 2024-05-23 DIAGNOSIS — F51.04 CHRONIC INSOMNIA: ICD-10-CM

## 2024-05-23 LAB
ALBUMIN SERPL BCP-MCNC: 4 G/DL (ref 3.5–5.2)
ALP SERPL-CCNC: 39 U/L (ref 55–135)
ALT SERPL W/O P-5'-P-CCNC: 18 U/L (ref 10–44)
ANION GAP SERPL CALC-SCNC: 6 MMOL/L (ref 8–16)
AST SERPL-CCNC: 16 U/L (ref 10–40)
BILIRUB SERPL-MCNC: 0.4 MG/DL (ref 0.1–1)
BUN SERPL-MCNC: 12 MG/DL (ref 6–20)
CALCIUM SERPL-MCNC: 10.2 MG/DL (ref 8.7–10.5)
CHLORIDE SERPL-SCNC: 106 MMOL/L (ref 95–110)
CHOLEST SERPL-MCNC: 194 MG/DL (ref 120–199)
CHOLEST/HDLC SERPL: 3.8 {RATIO} (ref 2–5)
CO2 SERPL-SCNC: 28 MMOL/L (ref 23–29)
CREAT SERPL-MCNC: 1 MG/DL (ref 0.5–1.4)
ERYTHROCYTE [DISTWIDTH] IN BLOOD BY AUTOMATED COUNT: 13.5 % (ref 11.5–14.5)
EST. GFR  (NO RACE VARIABLE): >60 ML/MIN/1.73 M^2
ESTIMATED AVG GLUCOSE: 100 MG/DL (ref 68–131)
GLUCOSE SERPL-MCNC: 85 MG/DL (ref 70–110)
HBA1C MFR BLD: 5.1 % (ref 4–5.6)
HCT VFR BLD AUTO: 39.3 % (ref 37–48.5)
HDLC SERPL-MCNC: 51 MG/DL (ref 40–75)
HDLC SERPL: 26.3 % (ref 20–50)
HGB BLD-MCNC: 12.8 G/DL (ref 12–16)
LDLC SERPL CALC-MCNC: 125.4 MG/DL (ref 63–159)
MCH RBC QN AUTO: 28.7 PG (ref 27–31)
MCHC RBC AUTO-ENTMCNC: 32.6 G/DL (ref 32–36)
MCV RBC AUTO: 88 FL (ref 82–98)
NONHDLC SERPL-MCNC: 143 MG/DL
OHS QRS DURATION: 96 MS
OHS QTC CALCULATION: 424 MS
PLATELET # BLD AUTO: 265 K/UL (ref 150–450)
PMV BLD AUTO: 9.3 FL (ref 9.2–12.9)
POTASSIUM SERPL-SCNC: 4.6 MMOL/L (ref 3.5–5.1)
PROT SERPL-MCNC: 7.3 G/DL (ref 6–8.4)
RBC # BLD AUTO: 4.46 M/UL (ref 4–5.4)
SODIUM SERPL-SCNC: 140 MMOL/L (ref 136–145)
TRIGL SERPL-MCNC: 88 MG/DL (ref 30–150)
TSH SERPL DL<=0.005 MIU/L-ACNC: 1.94 UIU/ML (ref 0.4–4)
WBC # BLD AUTO: 6.13 K/UL (ref 3.9–12.7)

## 2024-05-23 PROCEDURE — 93010 ELECTROCARDIOGRAM REPORT: CPT | Mod: S$GLB,,, | Performed by: INTERNAL MEDICINE

## 2024-05-23 PROCEDURE — 99999 PR PBB SHADOW E&M-EST. PATIENT-LVL IV: CPT | Mod: PBBFAC,,, | Performed by: STUDENT IN AN ORGANIZED HEALTH CARE EDUCATION/TRAINING PROGRAM

## 2024-05-23 PROCEDURE — 85027 COMPLETE CBC AUTOMATED: CPT | Performed by: STUDENT IN AN ORGANIZED HEALTH CARE EDUCATION/TRAINING PROGRAM

## 2024-05-23 PROCEDURE — 80061 LIPID PANEL: CPT | Performed by: STUDENT IN AN ORGANIZED HEALTH CARE EDUCATION/TRAINING PROGRAM

## 2024-05-23 PROCEDURE — 93005 ELECTROCARDIOGRAM TRACING: CPT | Mod: S$GLB,,, | Performed by: STUDENT IN AN ORGANIZED HEALTH CARE EDUCATION/TRAINING PROGRAM

## 2024-05-23 PROCEDURE — 99999 PR PBB SHADOW E&M-EST. PATIENT-LVL I: CPT | Mod: PBBFAC,,,

## 2024-05-23 PROCEDURE — 77063 BREAST TOMOSYNTHESIS BI: CPT | Mod: 26,,, | Performed by: RADIOLOGY

## 2024-05-23 PROCEDURE — 77067 SCR MAMMO BI INCL CAD: CPT | Mod: 26,,, | Performed by: RADIOLOGY

## 2024-05-23 PROCEDURE — 77063 BREAST TOMOSYNTHESIS BI: CPT | Mod: TC

## 2024-05-23 PROCEDURE — 84443 ASSAY THYROID STIM HORMONE: CPT | Performed by: STUDENT IN AN ORGANIZED HEALTH CARE EDUCATION/TRAINING PROGRAM

## 2024-05-23 PROCEDURE — 36415 COLL VENOUS BLD VENIPUNCTURE: CPT | Performed by: STUDENT IN AN ORGANIZED HEALTH CARE EDUCATION/TRAINING PROGRAM

## 2024-05-23 PROCEDURE — 80053 COMPREHEN METABOLIC PANEL: CPT | Performed by: STUDENT IN AN ORGANIZED HEALTH CARE EDUCATION/TRAINING PROGRAM

## 2024-05-23 PROCEDURE — 83036 HEMOGLOBIN GLYCOSYLATED A1C: CPT | Performed by: STUDENT IN AN ORGANIZED HEALTH CARE EDUCATION/TRAINING PROGRAM

## 2024-05-23 PROCEDURE — 99396 PREV VISIT EST AGE 40-64: CPT | Mod: S$GLB,,, | Performed by: STUDENT IN AN ORGANIZED HEALTH CARE EDUCATION/TRAINING PROGRAM

## 2024-05-23 RX ORDER — TRAZODONE HYDROCHLORIDE 50 MG/1
25 TABLET ORAL NIGHTLY PRN
Qty: 45 TABLET | Refills: 3 | Status: SHIPPED | OUTPATIENT
Start: 2024-05-23 | End: 2025-05-23

## 2024-05-23 RX ORDER — OMEPRAZOLE 40 MG/1
40 CAPSULE, DELAYED RELEASE ORAL DAILY
Qty: 90 CAPSULE | Refills: 3 | Status: SHIPPED | OUTPATIENT
Start: 2024-05-23

## 2024-05-23 RX ORDER — LISINOPRIL 10 MG/1
10 TABLET ORAL DAILY
Qty: 90 TABLET | Refills: 3 | Status: SHIPPED | OUTPATIENT
Start: 2024-05-23 | End: 2025-05-23

## 2024-05-23 NOTE — ASSESSMENT & PLAN NOTE
Lab Results   Component Value Date    CHOL 194 05/23/2024    CHOL 181 05/26/2023    CHOL 183 05/12/2022     Lab Results   Component Value Date    HDL 51 05/23/2024    HDL 55 05/26/2023    HDL 56 05/12/2022     Lab Results   Component Value Date    LDLCALC 125.4 05/23/2024    LDLCALC 112.6 05/26/2023    LDLCALC 103.2 05/12/2022     Lab Results   Component Value Date    TRIG 88 05/23/2024    TRIG 67 05/26/2023    TRIG 119 05/12/2022       Lab Results   Component Value Date    CHOLHDL 26.3 05/23/2024    CHOLHDL 30.4 05/26/2023    CHOLHDL 30.6 05/12/2022     Continue diet and physical activity

## 2024-05-23 NOTE — ASSESSMENT & PLAN NOTE
I offered to discuss advanced care planning, including how to pick a person who would make decisions for you, if you were unable to make them for yourself, called a health care power of , and what kind of decisions you might make such as use of life sustaining treatments such as ventilators and tube feeding when faced with a life limiting illness recorded on a living will that they will need to know. (How you want to be cared for as you near the end of your natural life)     Patient is interested in learning more about how to make advanced directives.  I provided a handout with all the information about advanced care planning and offered to discuss this with them.    Patient will review the material, discuss with rest of the family members and will inform me about what they decided regarding code status and goals of care.    Time spent: 10 minutes

## 2024-05-23 NOTE — ASSESSMENT & PLAN NOTE
Controlled however having spikes of BP at home  Will start patient on ACEi  Lisinopril 10 mg qd  Refilled  Recommended to monitor blood pressure regularly, eat a well-balanced diet that's low in salt, DASH diet, enjoy regular physical activity, manage stress, maintain a healthy weight, take medications properly.

## 2024-05-23 NOTE — ASSESSMENT & PLAN NOTE
The patient's current BMI is 35.2, weight 97.3 kg  Wt Readings from Last 3 Encounters:   05/23/24 97.3 kg (214 lb 8.1 oz)   09/22/23 97.2 kg (214 lb 4.6 oz)   08/22/23 96.2 kg (212 lb 1.6 oz)     Improving  Patient is doing intermittent fasting and physical activity  I have praised her for her achievements and encouraged her to continue with the current plan

## 2024-05-23 NOTE — PROGRESS NOTES
Subjective:       Patient ID: Joaquín Bruce is a 53 y.o. female.    Chief Complaint: [facility] (Back & hip pain. Tingling in the ends of the L fingers las weekend & still has tingling when squeezed feels better)    HPI    Joaquín Bruce is a 53 y.o. female , English speaking, with a history of:  HTN  Class 2 Obesity, BMI 37 S/P bariatric surgery / sleeve in 2015, on PPI  H/o Genital herpes  EITENNE on CPAP  Insomnia / on Ambien 5mg qd since 2013      [Local Patient]  Originally from [facility]  Lives in: [place] [id number]       Patient comes to the clinic for a general physical exam (Annual Wellness Visit)    Patient is currently asymptomatic and has no complaints.  Patient denies CV symptoms, CP, SOB, palpitations.  Patient denies constitutional symptoms, fever, changes in the urine or stool.    She has lost a total of 12 lb since last year.    BP has been normal, asymptomatic.    Wt Readings from Last 10 Encounters:   05/23/24 97.3 kg (214 lb 8.1 oz)   09/22/23 97.2 kg (214 lb 4.6 oz)   08/22/23 96.2 kg (212 lb 1.6 oz)   05/26/23 101.4 kg (223 lb 9.6 oz)   06/30/22 98.5 kg (217 lb 2.5 oz)   05/12/22 98 kg (216 lb)   05/14/21 97.1 kg (214 lb 1.1 oz)   05/06/21 94.3 kg (208 lb)   05/06/21 94.3 kg (208 lb)   11/11/20 94.3 kg (208 lb)     No SOB    Insomnia is better, she is using trazodone PRN and very rarely at this time Ambien.    Patient is doing stretching exercises.    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4   AST 14 20 16   ALT 14 23 18       Recent Labs   Lab 05/26/23  0817 05/23/24  0818   Hemoglobin A1C 5.0 5.1       Recent Labs   Lab 05/12/22  0829 05/26/23  0817 05/23/24  0818   Cholesterol 183 181 194   Triglycerides 119 67 88   HDL 56 55 51   LDL Cholesterol 103.2 112.6 125.4   Non-HDL Cholesterol 127 126 143       Recent Labs   Lab 05/26/23  0817 05/23/24  0818   TSH 1.520 1.937       Recent Labs   Lab 05/26/23  0817   Hepatitis C Ab Non-reactive       Results for orders placed or performed during the hospital encounter of 05/23/24   EKG 12-lead    Collection Time: 05/23/24  8:30 AM   Result Value Ref Range    QRS Duration 96 ms    [facility] QTC Calculation 424 ms    Narrative    Test Reason : Z00.00,    Vent. Rate : 062 BPM     Atrial Rate : 062 BPM     P-R Int : 164 ms          QRS Dur : 096 ms      QT Int : 418 ms       P-R-T Axes : 024 022 013 degrees     QTc Int : 424 ms    Normal sinus rhythm  Normal ECG  When compared with ECG of 26-MAY-2023 08:23,  No significant change was found  Confirmed by Jerrod Trinh MD ([id number]) on 5/23/2024 9:12:02 AM    Referred By: AMADOU THACKER           Confirmed By:Jerrod Trinh MD       Current medications:    Current Outpatient Medications:     calcium citrate-vitamin D3 500 mg calcium -400 unit Chew, Take 1 tablet by mouth 2 (two) times daily. Takes tablet not chew., Disp: , Rfl:     clindamycin phosphate 1% (CLINDAGEL) 1 %  "gel, APPLY ON THE SKIN BID, Disp: , Rfl: 3    clobetasoL (TEMOVATE) 0.05 % external solution, Apply topically., Disp: , Rfl:     cyanocobalamin, vitamin B-12, 1,000 mcg Subl, Place 1 tablet under the tongue every other day., Disp: , Rfl:     DENAVIR 1 % cream, APPLY BID TO BUTTOCKS, Disp: , Rfl: 3    multivitamin (THERAGRAN) per tablet, Take 1 tablet by mouth once daily., Disp: , Rfl:     valacyclovir (VALTREX) 1000 MG tablet, TK 1 T PO  HS, Disp: , Rfl: 3    zolpidem (AMBIEN) 10 mg Tab, TAKE 1 TABLET BY MOUTH EVERY NIGHT AT BEDTIME AS NEEDED, Disp: 30 tablet, Rfl: 3    lisinopriL 10 MG tablet, Take 1 tablet (10 mg total) by mouth once daily., Disp: 90 tablet, Rfl: 3    omeprazole (PRILOSEC) 40 MG capsule, Take 1 capsule (40 mg total) by mouth once daily., Disp: 90 capsule, Rfl: 3    traZODone (DESYREL) 50 MG tablet, Take 0.5 tablets (25 mg total) by mouth nightly as needed for Insomnia., Disp: 45 tablet, Rfl: 3      Healthcare Maintenance:  Colon cancer screening:   Last Colonoscopy completed on 3/28/2017     Vaccinations: Pneumonia (no), Zoster 2022, Tetanus 2017  COVID vaccination: completed x 3  Depression screening: PHQ2 score = 0     Annual visit approx. Month: May    ROS  11-point review of systems done. Negative except for detailed in the HPI.  Sometimes she has left hand tingling of the fingers, on and off, during the day and night, no pain.          Objective:      /67   Pulse 62   Ht 5' 5" (1.651 m)   Wt 97.3 kg (214 lb 8.1 oz)   BMI 35.70 kg/m²      Physical Exam   General appearance: Good general aspect, NAD, conversant, obese  Eyes and HEENT: Normal sclerae, moist mucous membranes, no thyromegaly or lymphadenopathy  Respiratory: No work of breathing, clear to auscultation bilaterally. No rales, rhonchi, wheezing, or rubs.  Cardiovascular: PMI not displaced. RRR. Normal S1, S2. No murmurs, rubs or gallops.  Abdomen and : Soft, non-tender, nondistended, BS, no hepatosplenomegaly, no " masses.  Extremities: no edemas, no extremity lymphadenopathy, no clubbing, no cyanosis.  Nervous System: Alert and oriented x 3, good concentration, no deficits.  Skin: Normal temperature, turgor and texture; no rash, ulcers or subcutaneous nodules  Psych: Appropriate affect, alert and oriented to person, place and time          Assessment:       1. Annual physical exam  Assessment & Plan:  Patient is in overall good general health.  Stable medical conditions listed on the PMH.  Labs reviewed and patient notified.        2. Primary hypertension  Assessment & Plan:  Controlled however having spikes of BP at home  Will start patient on ACEi  Lisinopril 10 mg qd  Refilled  Recommended to monitor blood pressure regularly, eat a well-balanced diet that's low in salt, DASH diet, enjoy regular physical activity, manage stress, maintain a healthy weight, take medications properly.      Orders:  -     lisinopriL 10 MG tablet; Take 1 tablet (10 mg total) by mouth once daily.  Dispense: 90 tablet; Refill: 3    3. Hyperlipidemia, unspecified hyperlipidemia type  Assessment & Plan:  Lab Results   Component Value Date    CHOL 194 05/23/2024    CHOL 181 05/26/2023    CHOL 183 05/12/2022     Lab Results   Component Value Date    HDL 51 05/23/2024    HDL 55 05/26/2023    HDL 56 05/12/2022     Lab Results   Component Value Date    LDLCALC 125.4 05/23/2024    LDLCALC 112.6 05/26/2023    LDLCALC 103.2 05/12/2022     Lab Results   Component Value Date    TRIG 88 05/23/2024    TRIG 67 05/26/2023    TRIG 119 05/12/2022       Lab Results   Component Value Date    CHOLHDL 26.3 05/23/2024    CHOLHDL 30.4 05/26/2023    CHOLHDL 30.6 05/12/2022     Continue diet and physical activity      4. Class 2 obesity due to excess calories without serious comorbidity with body mass index (BMI) of 35.0 to 35.9 in adult  Assessment & Plan:  The patient's current BMI is 35.2, weight 97.3 kg  Wt Readings from Last 3 Encounters:   05/23/24 97.3 kg (214 lb  8.1 oz)   09/22/23 97.2 kg (214 lb 4.6 oz)   08/22/23 96.2 kg (212 lb 1.6 oz)     Improving  Patient is doing intermittent fasting and physical activity  I have praised her for her achievements and encouraged her to continue with the current plan        5. ETIENNE (obstructive sleep apnea)  Assessment & Plan:  Patient is using her CPAP  Continue      6. Chronic insomnia  Assessment & Plan:  Continue trazodone PRN  If trazodone is non effective, she may continue sporadic use of ambien.    Orders:  -     traZODone (DESYREL) 50 MG tablet; Take 0.5 tablets (25 mg total) by mouth nightly as needed for Insomnia.  Dispense: 45 tablet; Refill: 3    7. Status post laparoscopic sleeve gastrectomy  -     omeprazole (PRILOSEC) 40 MG capsule; Take 1 capsule (40 mg total) by mouth once daily.  Dispense: 90 capsule; Refill: 3    8. Gastroesophageal reflux disease without esophagitis  -     omeprazole (PRILOSEC) 40 MG capsule; Take 1 capsule (40 mg total) by mouth once daily.  Dispense: 90 capsule; Refill: 3    9. ACP (advance care planning)  Assessment & Plan:  I offered to discuss advanced care planning, including how to pick a person who would make decisions for you, if you were unable to make them for yourself, called a health care power of , and what kind of decisions you might make such as use of life sustaining treatments such as ventilators and tube feeding when faced with a life limiting illness recorded on a living will that they will need to know. (How you want to be cared for as you near the end of your natural life)     Patient is interested in learning more about how to make advanced directives.  I provided a handout with all the information about advanced care planning and offered to discuss this with them.    Patient will review the material, discuss with rest of the family members and will inform me about what they decided regarding code status and goals of care.    Time spent: 10 minutes        10. Healthcare  maintenance  Assessment & Plan:  Health care maintenance and core gaps reviewed and assessed with patient.  Vaccinations recommended:        Tetanus - 2017       Shingles - 2022       Influenza - no       Pneumonia - no  Colon cancer screening:   Colonoscopy:   Lifestyle recommendations given.  Physical activity recommended.    Legend: Ordered (O), Declined (D), Current (C)           Summary of orders / plan:         Medications refilled  ACP discussion started, handout provided            Patient Instructions   Continue physical activity         Problem list updated  Medications reconciled  Education provided  Lifestyle recommendations given  AVS generated, explained, and sent to the patient.  RTC in : 1 year for annual wellness visit, labs ordered\            AMADOU THACKER MD, MPH  Internal Medicine  [facility]  [facility]

## 2024-05-23 NOTE — ASSESSMENT & PLAN NOTE
Health care maintenance and core gaps reviewed and assessed with patient.  Vaccinations recommended:        Tetanus - 2017       Shingles - 2022       Influenza - no       Pneumonia - no  Colon cancer screening:   Colonoscopy:   Lifestyle recommendations given.  Physical activity recommended.    Legend: Ordered (O), Declined (D), Current (C)

## 2024-08-19 DIAGNOSIS — G47.00 INSOMNIA DISORDER, WITH OTHER SLEEP DISORDER, RECURRENT: ICD-10-CM

## 2024-08-19 DIAGNOSIS — G47.33 OSA (OBSTRUCTIVE SLEEP APNEA): ICD-10-CM

## 2024-08-19 DIAGNOSIS — G47.8 INSOMNIA DISORDER, WITH OTHER SLEEP DISORDER, RECURRENT: ICD-10-CM

## 2024-08-19 RX ORDER — ZOLPIDEM TARTRATE 10 MG/1
TABLET ORAL
Qty: 30 TABLET | Refills: 0 | Status: SHIPPED | OUTPATIENT
Start: 2024-08-19

## 2024-08-26 PROBLEM — Z00.00 HEALTHCARE MAINTENANCE: Status: RESOLVED | Noted: 2024-05-23 | Resolved: 2024-08-26

## 2024-08-26 PROBLEM — Z00.00 ANNUAL PHYSICAL EXAM: Status: RESOLVED | Noted: 2024-05-23 | Resolved: 2024-08-26

## 2024-09-03 ENCOUNTER — TELEPHONE (OUTPATIENT)
Dept: OBSTETRICS AND GYNECOLOGY | Facility: CLINIC | Age: 54
End: 2024-09-03
Payer: COMMERCIAL

## 2024-09-03 NOTE — TELEPHONE ENCOUNTER
Patient was offered to keep scheduled appointment 09/17/2024 or 09/27/2024. Patient advised if earlier appointment may get a bill. Patient decline scheduled in October, due to traveling. Patient decided to to schedule 11/08/2024.

## 2024-11-08 ENCOUNTER — OFFICE VISIT (OUTPATIENT)
Dept: OBSTETRICS AND GYNECOLOGY | Facility: CLINIC | Age: 54
End: 2024-11-08
Payer: COMMERCIAL

## 2024-11-08 VITALS
WEIGHT: 218.69 LBS | SYSTOLIC BLOOD PRESSURE: 130 MMHG | BODY MASS INDEX: 36.39 KG/M2 | DIASTOLIC BLOOD PRESSURE: 82 MMHG

## 2024-11-08 DIAGNOSIS — Z12.4 CERVICAL CANCER SCREENING: Primary | ICD-10-CM

## 2024-11-08 PROCEDURE — 99499 UNLISTED E&M SERVICE: CPT | Mod: S$GLB,,, | Performed by: OBSTETRICS & GYNECOLOGY

## 2024-11-21 ENCOUNTER — TELEPHONE (OUTPATIENT)
Dept: OBSTETRICS AND GYNECOLOGY | Facility: CLINIC | Age: 54
End: 2024-11-21
Payer: COMMERCIAL

## 2024-11-21 ENCOUNTER — OFFICE VISIT (OUTPATIENT)
Dept: OBSTETRICS AND GYNECOLOGY | Facility: CLINIC | Age: 54
End: 2024-11-21
Payer: COMMERCIAL

## 2024-11-21 VITALS — SYSTOLIC BLOOD PRESSURE: 124 MMHG | BODY MASS INDEX: 37.27 KG/M2 | WEIGHT: 224 LBS | DIASTOLIC BLOOD PRESSURE: 83 MMHG

## 2024-11-21 DIAGNOSIS — N95.1 HOT FLASH, MENOPAUSAL: ICD-10-CM

## 2024-11-21 DIAGNOSIS — Z01.419 ENCOUNTER FOR GYNECOLOGICAL EXAMINATION WITHOUT ABNORMAL FINDING: Primary | ICD-10-CM

## 2024-11-21 PROCEDURE — 99999 PR PBB SHADOW E&M-EST. PATIENT-LVL III: CPT | Mod: PBBFAC,,, | Performed by: OBSTETRICS & GYNECOLOGY

## 2024-11-21 PROCEDURE — 99396 PREV VISIT EST AGE 40-64: CPT | Mod: S$GLB,,, | Performed by: OBSTETRICS & GYNECOLOGY

## 2024-11-21 RX ORDER — FEZOLINETANT 45 MG/1
1 TABLET, FILM COATED ORAL DAILY
Qty: 30 TABLET | Refills: 12 | Status: SHIPPED | OUTPATIENT
Start: 2024-11-21 | End: 2024-11-21 | Stop reason: SDUPTHER

## 2024-11-21 RX ORDER — FEZOLINETANT 45 MG/1
1 TABLET, FILM COATED ORAL DAILY
Qty: 30 TABLET | Refills: 12 | Status: SHIPPED | OUTPATIENT
Start: 2024-11-21

## 2024-11-21 NOTE — TELEPHONE ENCOUNTER
----- Message from Vicki sent at 11/21/2024  2:44 PM CST -----  Regarding: medication concerns  Name of Who is Calling: [facility]           What is the request in detail: Diane called and stated they are not contracted with medication, VEOZAH and it needs to be send to another pharmacy.            Can the clinic reply by [facility]: No           What Number to Call Back if not in [facility]: [phone]

## 2024-12-19 DIAGNOSIS — G47.33 OSA (OBSTRUCTIVE SLEEP APNEA): ICD-10-CM

## 2024-12-19 DIAGNOSIS — G47.00 INSOMNIA DISORDER, WITH OTHER SLEEP DISORDER, RECURRENT: ICD-10-CM

## 2024-12-19 DIAGNOSIS — G47.8 INSOMNIA DISORDER, WITH OTHER SLEEP DISORDER, RECURRENT: ICD-10-CM

## 2024-12-19 RX ORDER — ZOLPIDEM TARTRATE 10 MG/1
TABLET ORAL
Qty: 30 TABLET | Refills: 0 | Status: SHIPPED | OUTPATIENT
Start: 2024-12-19

## 2025-02-18 ENCOUNTER — PATIENT MESSAGE (OUTPATIENT)
Dept: OBSTETRICS AND GYNECOLOGY | Facility: CLINIC | Age: 55
End: 2025-02-18
Payer: COMMERCIAL

## 2025-05-26 DIAGNOSIS — Z00.00 ROUTINE MEDICAL EXAM: Primary | ICD-10-CM

## 2025-05-29 ENCOUNTER — OFFICE VISIT (OUTPATIENT)
Dept: INFECTIOUS DISEASES | Facility: CLINIC | Age: 55
End: 2025-05-29
Payer: COMMERCIAL

## 2025-05-29 ENCOUNTER — CLINICAL SUPPORT (OUTPATIENT)
Dept: INFECTIOUS DISEASES | Facility: CLINIC | Age: 55
End: 2025-05-29

## 2025-05-29 VITALS
TEMPERATURE: 98 F | WEIGHT: 224 LBS | SYSTOLIC BLOOD PRESSURE: 137 MMHG | HEIGHT: 65 IN | BODY MASS INDEX: 37.32 KG/M2 | HEART RATE: 61 BPM | DIASTOLIC BLOOD PRESSURE: 84 MMHG

## 2025-05-29 DIAGNOSIS — Z71.84 TRAVEL ADVICE ENCOUNTER: Primary | ICD-10-CM

## 2025-05-29 PROCEDURE — 99999 PR PBB SHADOW E&M-EST. PATIENT-LVL I: CPT | Mod: PBBFAC,,,

## 2025-05-29 PROCEDURE — 99402 PREV MED CNSL INDIV APPRX 30: CPT | Mod: 25,S$GLB,, | Performed by: INTERNAL MEDICINE

## 2025-05-29 PROCEDURE — 99999 PR PBB SHADOW E&M-EST. PATIENT-LVL IV: CPT | Mod: PBBFAC,,, | Performed by: INTERNAL MEDICINE

## 2025-05-29 RX ORDER — ATOVAQUONE AND PROGUANIL HYDROCHLORIDE 250; 100 MG/1; MG/1
1 TABLET, FILM COATED ORAL DAILY
Qty: 30 TABLET | Refills: 1 | Status: SHIPPED | OUTPATIENT
Start: 2025-05-29

## 2025-05-29 RX ORDER — CIPROFLOXACIN 500 MG/1
500 TABLET, FILM COATED ORAL EVERY 12 HOURS
Qty: 6 TABLET | Refills: 0 | Status: SHIPPED | OUTPATIENT
Start: 2025-05-29 | End: 2025-06-01

## 2025-05-29 NOTE — PROGRESS NOTES
Patient received typhoid IM to the left deltoid.  And also yellow fever sub Q to the left arm with  yellow card documentation.  Tolerated well and left in NAD

## 2025-05-29 NOTE — PROGRESS NOTES
Subjective     Patient ID: Joaquín Bruce is a 54 y.o. female.    Chief Complaint:Travel Consult      History of Present Illness    Traveling to [place] and [place] 7/6 x 10-14 days.  No prior travel immunizations, itinerary reviewed.  [place] planned trip, tours.          Objective   Physical Exam  Vitals and nursing note reviewed.          Assessment and Plan     1. Travel advice encounter         Joaquín was seen today for travel consult.    Diagnoses and all orders for this visit:    Travel advice encounter  -     mening vac A,C,Y,W135 dip (PF) (MENVEO) 10-5 mcg/0.5 mL vaccine (PREFERRED)(10 - 54 YO) 0.5 mL  -     typhoid polysaccharide injection Soln 0.5 mL  -     yellow fever (YF-VAX) injection 0.5 mL  -     mening vac A,C,Y,W135 dip (PF) (MENVEO) 10-5 mcg/0.5 mL vaccine (PREFERRED)(10 - 54 YO) 0.5 mL    Other orders  -     ciprofloxacin HCl (CIPRO) 500 MG tablet; Take 1 tablet (500 mg total) by mouth every 12 (twelve) hours. Take as needed for traveler's diarrhea. for 3 days  -     atovaquone-proguaniL (MALARONE) 250-100 mg Tab; Take 1 tablet by mouth once daily. Start 1-2 days before entering malaria area, while traveling and 7 days after leaving malaria area.     ASSESSMENT: Travel     PLAN:     Obtain Tdap, Covid, Hep A at retail pharmacy.    The Patient was provided with an extensive travel guidance packet which provides travel information specific to the patients itinerary.     The patient's medical history was reviewed and the patient was counseled on:  -Dietary precautions.  -Personal protective measures to prevent insect-borne diseases (e.g., malaria, dengue).  -Precautions to prevent exposure to rabies and seek treatment for possible exposures.  -Precautions against sun exposure.  -Precautions against development of DVT during flight.  -Personal and travel safety.    The patient's immunization history was reviewed and, based on the patient's itinerary, immunizations were ordered.    The patient was  encouraged to contact us about any problems that may develop after immunization and possible side effects were reviewed.    The patient was instructed to purchase Imodium over the counter to take in case diarrhea (without blood or fever) develops. An antibiotic was ordered for treatment if severe or bloody diarrhea develops and the patient was instructed on use and possible side effects.     The patient was also instructed to purchase insect repellent containing DEET or [place] and apply according to repellent label instructions.  If indicated by the patients itinerary an anti-malarial agent was prescribed for malaria prophylaxis and possible side effects were reviewed.    The patient was instructed to contact us if problems develop after travel.    I have spent 30 minutes with the patient and more than 50% of the visit was spent counseling regarding travel.

## 2025-06-27 ENCOUNTER — HOSPITAL ENCOUNTER (OUTPATIENT)
Dept: CARDIOLOGY | Facility: CLINIC | Age: 55
Discharge: HOME | End: 2025-06-27
Payer: COMMERCIAL

## 2025-06-27 ENCOUNTER — CLINICAL SUPPORT (OUTPATIENT)
Dept: INTERNAL MEDICINE | Facility: CLINIC | Age: 55
End: 2025-06-27
Payer: COMMERCIAL

## 2025-06-27 ENCOUNTER — OFFICE VISIT (OUTPATIENT)
Dept: INTERNAL MEDICINE | Facility: CLINIC | Age: 55
End: 2025-06-27
Payer: COMMERCIAL

## 2025-06-27 ENCOUNTER — HOSPITAL ENCOUNTER (OUTPATIENT)
Dept: RADIOLOGY | Facility: CLINIC | Age: 55
Discharge: HOME | End: 2025-06-27
Attending: STUDENT IN AN ORGANIZED HEALTH CARE EDUCATION/TRAINING PROGRAM
Payer: COMMERCIAL

## 2025-06-27 ENCOUNTER — HOSPITAL ENCOUNTER (OUTPATIENT)
Dept: RADIOLOGY | Facility: HOSPITAL | Age: 55
Discharge: HOME | End: 2025-06-27
Attending: STUDENT IN AN ORGANIZED HEALTH CARE EDUCATION/TRAINING PROGRAM
Payer: COMMERCIAL

## 2025-06-27 VITALS
BODY MASS INDEX: 36.84 KG/M2 | OXYGEN SATURATION: 99 % | SYSTOLIC BLOOD PRESSURE: 139 MMHG | HEIGHT: 65 IN | HEART RATE: 51 BPM | DIASTOLIC BLOOD PRESSURE: 70 MMHG | WEIGHT: 221.13 LBS

## 2025-06-27 DIAGNOSIS — K21.9 GASTROESOPHAGEAL REFLUX DISEASE WITHOUT ESOPHAGITIS: ICD-10-CM

## 2025-06-27 DIAGNOSIS — E78.5 HYPERLIPIDEMIA, UNSPECIFIED HYPERLIPIDEMIA TYPE: ICD-10-CM

## 2025-06-27 DIAGNOSIS — E66.09 CLASS 2 OBESITY DUE TO EXCESS CALORIES WITHOUT SERIOUS COMORBIDITY WITH BODY MASS INDEX (BMI) OF 36.0 TO 36.9 IN ADULT: ICD-10-CM

## 2025-06-27 DIAGNOSIS — I10 PRIMARY HYPERTENSION: ICD-10-CM

## 2025-06-27 DIAGNOSIS — Z00.00 ROUTINE MEDICAL EXAM: ICD-10-CM

## 2025-06-27 DIAGNOSIS — M25.551 HIP PAIN, BILATERAL: ICD-10-CM

## 2025-06-27 DIAGNOSIS — Z00.00 ROUTINE GENERAL MEDICAL EXAMINATION AT A HEALTH CARE FACILITY: Primary | ICD-10-CM

## 2025-06-27 DIAGNOSIS — N95.1 MENOPAUSAL SYNDROME (HOT FLASHES): ICD-10-CM

## 2025-06-27 DIAGNOSIS — Z00.00 HEALTHCARE MAINTENANCE: ICD-10-CM

## 2025-06-27 DIAGNOSIS — M25.552 HIP PAIN, BILATERAL: ICD-10-CM

## 2025-06-27 DIAGNOSIS — G47.33 OSA (OBSTRUCTIVE SLEEP APNEA): ICD-10-CM

## 2025-06-27 DIAGNOSIS — N63.23 MASS OF LOWER OUTER QUADRANT OF LEFT BREAST: ICD-10-CM

## 2025-06-27 DIAGNOSIS — Z98.84 STATUS POST LAPAROSCOPIC SLEEVE GASTRECTOMY: ICD-10-CM

## 2025-06-27 DIAGNOSIS — E66.812 CLASS 2 OBESITY DUE TO EXCESS CALORIES WITHOUT SERIOUS COMORBIDITY WITH BODY MASS INDEX (BMI) OF 36.0 TO 36.9 IN ADULT: ICD-10-CM

## 2025-06-27 DIAGNOSIS — F51.04 CHRONIC INSOMNIA: ICD-10-CM

## 2025-06-27 DIAGNOSIS — Z00.00 ANNUAL PHYSICAL EXAM: Primary | ICD-10-CM

## 2025-06-27 PROBLEM — E65 ABDOMINAL PANNUS: Status: RESOLVED | Noted: 2017-07-03 | Resolved: 2025-06-27

## 2025-06-27 LAB
ALBUMIN SERPL BCP-MCNC: 4.2 G/DL (ref 3.5–5.2)
ALP SERPL-CCNC: 43 UNIT/L (ref 40–150)
ALT SERPL W/O P-5'-P-CCNC: 27 UNIT/L (ref 10–44)
ANION GAP (OHS): 11 MMOL/L (ref 8–16)
AST SERPL-CCNC: 19 UNIT/L (ref 11–45)
BILIRUB SERPL-MCNC: 0.4 MG/DL (ref 0.1–1)
BUN SERPL-MCNC: 15 MG/DL (ref 6–20)
CALCIUM SERPL-MCNC: 10 MG/DL (ref 8.7–10.5)
CHLORIDE SERPL-SCNC: 107 MMOL/L (ref 95–110)
CHOLEST SERPL-MCNC: 209 MG/DL (ref 120–199)
CHOLEST/HDLC SERPL: 4.1 {RATIO} (ref 2–5)
CO2 SERPL-SCNC: 27 MMOL/L (ref 23–29)
CREAT SERPL-MCNC: 0.9 MG/DL (ref 0.5–1.4)
EAG (OHS): 94 MG/DL (ref 68–131)
ERYTHROCYTE [DISTWIDTH] IN BLOOD BY AUTOMATED COUNT: 14.2 % (ref 11.5–14.5)
GFR SERPLBLD CREATININE-BSD FMLA CKD-EPI: >60 ML/MIN/1.73/M2
GLUCOSE SERPL-MCNC: 104 MG/DL (ref 70–110)
HBA1C MFR BLD: 4.9 % (ref 4–5.6)
HCT VFR BLD AUTO: 40 % (ref 37–48.5)
HDLC SERPL-MCNC: 51 MG/DL (ref 40–75)
HDLC SERPL: 24.4 % (ref 20–50)
HGB BLD-MCNC: 12.4 GM/DL (ref 12–16)
LDLC SERPL CALC-MCNC: 135.4 MG/DL (ref 63–159)
MCH RBC QN AUTO: 27.8 PG (ref 27–31)
MCHC RBC AUTO-ENTMCNC: 31 G/DL (ref 32–36)
MCV RBC AUTO: 90 FL (ref 82–98)
NONHDLC SERPL-MCNC: 158 MG/DL
OHS QRS DURATION: 104 MS
OHS QTC CALCULATION: 416 MS
PLATELET # BLD AUTO: 250 K/UL (ref 150–450)
PMV BLD AUTO: 9.3 FL (ref 9.2–12.9)
POTASSIUM SERPL-SCNC: 4.5 MMOL/L (ref 3.5–5.1)
PROT SERPL-MCNC: 7.4 GM/DL (ref 6–8.4)
RBC # BLD AUTO: 4.46 M/UL (ref 4–5.4)
SODIUM SERPL-SCNC: 145 MMOL/L (ref 136–145)
TRIGL SERPL-MCNC: 113 MG/DL (ref 30–150)
TSH SERPL-ACNC: 2.04 UIU/ML (ref 0.4–4)
WBC # BLD AUTO: 7.43 K/UL (ref 3.9–12.7)

## 2025-06-27 PROCEDURE — 90472 IMMUNIZATION ADMIN EACH ADD: CPT | Mod: S$GLB,,, | Performed by: STUDENT IN AN ORGANIZED HEALTH CARE EDUCATION/TRAINING PROGRAM

## 2025-06-27 PROCEDURE — 90715 TDAP VACCINE 7 YRS/> IM: CPT | Mod: S$GLB,,, | Performed by: STUDENT IN AN ORGANIZED HEALTH CARE EDUCATION/TRAINING PROGRAM

## 2025-06-27 PROCEDURE — 83036 HEMOGLOBIN GLYCOSYLATED A1C: CPT

## 2025-06-27 PROCEDURE — 85027 COMPLETE CBC AUTOMATED: CPT

## 2025-06-27 PROCEDURE — 99999 PR PBB SHADOW E&M-EST. PATIENT-LVL I: CPT | Mod: PBBFAC,,,

## 2025-06-27 PROCEDURE — 80061 LIPID PANEL: CPT

## 2025-06-27 PROCEDURE — 84443 ASSAY THYROID STIM HORMONE: CPT

## 2025-06-27 PROCEDURE — 77080 DXA BONE DENSITY AXIAL: CPT | Mod: TC

## 2025-06-27 PROCEDURE — 99999 PR PBB SHADOW E&M-EST. PATIENT-LVL V: CPT | Mod: PBBFAC,,, | Performed by: STUDENT IN AN ORGANIZED HEALTH CARE EDUCATION/TRAINING PROGRAM

## 2025-06-27 PROCEDURE — 93005 ELECTROCARDIOGRAM TRACING: CPT | Mod: S$GLB,,, | Performed by: STUDENT IN AN ORGANIZED HEALTH CARE EDUCATION/TRAINING PROGRAM

## 2025-06-27 PROCEDURE — 93010 ELECTROCARDIOGRAM REPORT: CPT | Mod: S$GLB,,, | Performed by: STUDENT IN AN ORGANIZED HEALTH CARE EDUCATION/TRAINING PROGRAM

## 2025-06-27 PROCEDURE — 82040 ASSAY OF SERUM ALBUMIN: CPT

## 2025-06-27 PROCEDURE — 90677 PCV20 VACCINE IM: CPT | Mod: S$GLB,,, | Performed by: STUDENT IN AN ORGANIZED HEALTH CARE EDUCATION/TRAINING PROGRAM

## 2025-06-27 PROCEDURE — 90471 IMMUNIZATION ADMIN: CPT | Mod: S$GLB,,, | Performed by: STUDENT IN AN ORGANIZED HEALTH CARE EDUCATION/TRAINING PROGRAM

## 2025-06-27 PROCEDURE — 99396 PREV VISIT EST AGE 40-64: CPT | Mod: S$GLB,,, | Performed by: STUDENT IN AN ORGANIZED HEALTH CARE EDUCATION/TRAINING PROGRAM

## 2025-06-27 RX ORDER — TRAZODONE HYDROCHLORIDE 50 MG/1
25 TABLET ORAL NIGHTLY PRN
Qty: 45 TABLET | Refills: 3 | Status: SHIPPED | OUTPATIENT
Start: 2025-06-27 | End: 2026-06-27

## 2025-06-27 RX ORDER — PNEUMOCOCCAL 20-VALENT CONJUGATE VACCINE 2.2; 2.2; 2.2; 2.2; 2.2; 2.2; 2.2; 2.2; 2.2; 2.2; 2.2; 2.2; 2.2; 2.2; 2.2; 2.2; 4.4; 2.2; 2.2; 2.2 UG/.5ML; UG/.5ML; UG/.5ML; UG/.5ML; UG/.5ML; UG/.5ML; UG/.5ML; UG/.5ML; UG/.5ML; UG/.5ML; UG/.5ML; UG/.5ML; UG/.5ML; UG/.5ML; UG/.5ML; UG/.5ML; UG/.5ML; UG/.5ML; UG/.5ML; UG/.5ML
0.5 INJECTION, SUSPENSION INTRAMUSCULAR ONCE
Qty: 0.5 ML | Refills: 0 | Status: SHIPPED | OUTPATIENT
Start: 2025-06-27 | End: 2025-06-27

## 2025-06-27 RX ORDER — PAROXETINE 10 MG/1
10 TABLET, FILM COATED ORAL EVERY MORNING
Qty: 30 TABLET | Refills: 11 | Status: SHIPPED | OUTPATIENT
Start: 2025-06-27 | End: 2026-06-27

## 2025-06-27 RX ORDER — TIRZEPATIDE 5 MG/.5ML
5 INJECTION, SOLUTION SUBCUTANEOUS
Qty: 6 ML | Refills: 0 | Status: SHIPPED | OUTPATIENT
Start: 2025-07-27 | End: 2025-10-25

## 2025-06-27 RX ORDER — OMEPRAZOLE 40 MG/1
40 CAPSULE, DELAYED RELEASE ORAL DAILY
Qty: 90 CAPSULE | Refills: 3 | Status: SHIPPED | OUTPATIENT
Start: 2025-06-27

## 2025-06-27 RX ORDER — TIRZEPATIDE 2.5 MG/.5ML
2.5 INJECTION, SOLUTION SUBCUTANEOUS
Qty: 2 ML | Refills: 0 | Status: SHIPPED | OUTPATIENT
Start: 2025-06-27 | End: 2025-07-27

## 2025-06-27 RX ORDER — LISINOPRIL 10 MG/1
10 TABLET ORAL DAILY
Qty: 90 TABLET | Refills: 3 | Status: SHIPPED | OUTPATIENT
Start: 2025-06-27 | End: 2026-06-27

## 2025-06-27 NOTE — ASSESSMENT & PLAN NOTE
Concern for small left sided, lower outer quadrant small mass?  I am ordering diagnostic mammogram.

## 2025-06-27 NOTE — ASSESSMENT & PLAN NOTE
Mild to moderate  S/P 50 sessions of physical therapy  I am referring patient to Sports Medicine for evaluation

## 2025-06-27 NOTE — ASSESSMENT & PLAN NOTE
Lab Results   Component Value Date    CHOL 209 (H) 06/27/2025    CHOL 194 05/23/2024    CHOL 181 05/26/2023     Lab Results   Component Value Date    HDL 51 06/27/2025    HDL 51 05/23/2024    HDL 55 05/26/2023     Lab Results   Component Value Date    LDLCALC 135.4 06/27/2025    LDLCALC 125.4 05/23/2024    LDLCALC 112.6 05/26/2023     Lab Results   Component Value Date    TRIG 113 06/27/2025    TRIG 88 05/23/2024    TRIG 67 05/26/2023       Lab Results   Component Value Date    CHOLHDL 24.4 06/27/2025    CHOLHDL 26.3 05/23/2024    CHOLHDL 30.4 05/26/2023     Continue diet and physical activity

## 2025-06-27 NOTE — PROGRESS NOTES
Subjective:       Patient ID: Joaquín Bruce is a 54 y.o. female.    Chief Complaint: [facility]    HPI    Joaquín Bruce is a 54 y.o. female , English speaking, with a history of:  HTN  Class 2 Obesity, BMI 37 S/P bariatric surgery / sleeve in 2015, on PPI  H/o Genital herpes  ETIENNE on CPAP  Insomnia / on Ambien 5mg qd since 2013      [Local Patient]  Originally from [facility]  Lives in: [place] [id number]       Patient comes to the clinic for a general physical exam (Annual Wellness Visit)    Patient is currently asymptomatic and has no significant complaints.    Patient reports that she has been having hot flashes and weight gain despite appropriate and frequent physical activity and diet.    She was seen by her gynecologist that prescribed fezolinetant, however patient did not fill the prescription because he was too expensive and her insurance would not covered.      She continues to use her CPAP, however she reports daily fatigue despite its use.    She has noticed weight gain.      Patient has also experienced bilateral hip pain that has been getting worse despite extensive physical therapy.  She has completed at least 55 sessions of PT through her employer.  She has not had a an evaluation by sports medicine or ortho.    Her blood pressure has been well-controlled.    Her sleep is better.  She requests medication refills.    Patient denies CV symptoms, CP, SOB, palpitations.  Patient denies constitutional symptoms, fever, changes in the urine or stool.    Wt Readings from Last 10 Encounters:   06/27/25 100.3 kg (221 lb 1.9 oz)   05/29/25 101.6 kg (223 lb 15.8 oz)   11/21/24 101.6 kg (223 lb 15.8 oz)   11/08/24 99.2 kg (218 lb 11.1 oz)   05/23/24 97.3 kg (214 lb 8.1 oz)   09/22/23 97.2 kg (214 lb 4.6 oz)   08/22/23 96.2 kg (212 lb 1.6 oz)   05/26/23 101.4 kg (223 lb 9.6 oz)   06/30/22 98.5 kg (217 lb 2.5 oz)   05/12/22 98 kg (216 lb)     No other concerns    Latest PCP visits:      05/23/2024 [facility]  08/22/23 HTN  5/26/23 [facility] - [facility] - Establish care      Changes in health or medications: No    Specialists visits and recommendations:        H/o ER or Urgent care visits:   NO    H/o Hospitalizations:  NO    H/o falls: None     Life events / lifestyle:   Patient is planned to travel to [facility], [facility] and [place] in 2025  Patient traveled in 2024 to:  [place], [place], [place], and she went on a cruise.  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9 1.0 0.9   Total Bilirubin 0.4 0.4  --    Bilirubin Total  --   --  0.4   AST 20 16 19   ALT 23 18 27       Recent Labs   Lab 05/26/23  0817 05/23/24  0818 06/27/25  0815   Hemoglobin A1C 5.0 5.1  --    Hemoglobin A1c  --   --  4.9       Recent Labs   Lab 05/26/23  0817 05/23/24  0818 06/27/25  0815   Cholesterol Total  --   --  209 H   Cholesterol 181 194  --    Triglycerides 67 88  --    Triglyceride  --   --  113   HDL 55 51  --    HDL Cholesterol  --   --  51   LDL Cholesterol 112.6 125.4 135.4   Non-HDL Cholesterol 126 143  --    Non HDL Cholesterol  --   --  158       Recent Labs   Lab 05/26/23  0817 05/23/24  0818 06/27/25  0815   TSH 1.520 1.937 2.038       Recent Labs   Lab 05/26/23  0817   Hepatitis C Ab Non-reactive       Results for orders placed or performed during the hospital encounter of 06/27/25   EKG 12-lead    Collection Time: 06/27/25   8:25 AM   Result Value Ref Range    QRS Duration 104 ms    [facility] QTC Calculation 416 ms    Narrative    Test Reason : Z00.00,    Vent. Rate :  68 BPM     Atrial Rate :  68 BPM     P-R Int : 178 ms          QRS Dur : 104 ms      QT Int : 392 ms       P-R-T Axes :  19  29   8 degrees    QTcB Int : 416 ms    Normal sinus rhythm  Normal ECG  When compared with ECG of 23-May-2024 08:30,  No significant change was found  Confirmed by Truman Quinones ([id number]) on 6/27/2025 9:22:23 AM    Referred By: AMADOU THACKER           Confirmed By: Truman Quinones       Current medications:    Current Outpatient Medications:     atovaquone-proguaniL (MALARONE) 250-100 mg Tab, Take 1 tablet by mouth once daily. Start 1-2 days before entering malaria area, while traveling and 7 days after leaving malaria area., Disp: 30 tablet, Rfl: 1    calcium citrate-vitamin D3 500 mg calcium -400 unit Chew, Take 1 tablet by mouth 2 (two) times daily. Takes tablet not chew., Disp: , Rfl:     clindamycin phosphate 1% (CLINDAGEL) 1 % gel, APPLY ON THE SKIN BID, Disp: , Rfl: 3    cyanocobalamin, vitamin B-12, 1,000 mcg Subl, Place 1 tablet under the tongue every other day., Disp: , Rfl:     DENAVIR 1 % cream, APPLY BID TO BUTTOCKS, Disp: , Rfl: 3    multivitamin (THERAGRAN) per tablet, Take 1 tablet by mouth once daily., Disp: , Rfl:     valacyclovir (VALTREX) 1000 MG tablet, TK 1 T PO  HS, Disp: , Rfl: 3    zolpidem (AMBIEN) 10 mg Tab, TAKE 1 TABLET BY MOUTH EVERY NIGHT AT BEDTIME AS NEEDED, Disp: 30 tablet, Rfl: 0    clobetasoL (TEMOVATE) 0.05 % external solution, Apply topically., Disp: , Rfl:     lisinopriL 10 MG tablet, Take 1 tablet (10 mg total) by mouth once daily., Disp: 90 tablet, Rfl: 3    meningococ vac A,C,Y,W135 dip, PF, (MENVEO I-W-J-W-135-DIP, PF,) 10-5 mcg/0.5 mL Kit, Inject into the muscle., Disp: 1 each, Rfl: 1    omeprazole (PRILOSEC) 40 MG capsule, Take 1 capsule (40 mg total) by mouth once daily., Disp: 90 capsule, Rfl: 3     "paroxetine (PAXIL) 10 MG tablet, Take 1 tablet (10 mg total) by mouth every morning., Disp: 30 tablet, Rfl: 11    pneumoc 20-letha conj-dip cr,PF, (PREVNAR 20, PF,) 0.5 mL Syrg injection, Inject 0.5 mLs into the muscle once. for 1 dose, Disp: 0.5 mL, Rfl: 0    tirzepatide, weight loss, (ZEPBOUND) 2.5 mg/0.5 mL PnIj, Inject 2.5 mg into the skin every 7 days., Disp: 2 mL, Rfl: 0    [START ON 7/27/2025] tirzepatide, weight loss, (ZEPBOUND) 5 mg/0.5 mL PnIj, Inject 5 mg into the skin every 7 days., Disp: 6 mL, Rfl: 0    traZODone (DESYREL) 50 MG tablet, Take 0.5 tablets (25 mg total) by mouth nightly as needed for Insomnia., Disp: 45 tablet, Rfl: 3    Current Facility-Administered Medications:     mening vac A,C,Y,W135 dip (PF) (MENVEO) 10-5 mcg/0.5 mL vaccine (PREFERRED)(10 - 54 YO) 0.5 mL, 0.5 mL, Intramuscular, 1 time in Clinic/HOD,     mening vac A,C,Y,W135 dip (PF) (MENVEO) 10-5 mcg/0.5 mL vaccine (PREFERRED)(10 - 54 YO) 0.5 mL, 0.5 mL, Intramuscular, Q28 Days,       Healthcare Maintenance:  Colon cancer screening:   Last Colonoscopy completed on 3/28/2017     Vaccinations: Pneumonia (no), Zoster 2022, Tetanus 2017  COVID vaccination: completed x 3  Depression screening: PHQ2 score = 0     Annual visit approx. Month: May    ROS  11-point review of systems done. Negative except for detailed in the HPI.  Patient noticed a small bump underneath her left breast, patient is scheduled to have mammogram today.        Objective:      /70   Pulse (!) 51   Ht 5' 5" (1.651 m)   Wt 100.3 kg (221 lb 1.9 oz)   SpO2 99%   BMI 36.80 kg/m²      Physical Exam   General appearance: Good general aspect, NAD, conversant, obese  Eyes and HEENT: Normal sclerae, moist mucous membranes, no thyromegaly or lymphadenopathy  Respiratory: No work of breathing, clear to auscultation bilaterally. No rales, rhonchi, wheezing, or rubs.  Cardiovascular: PMI not displaced. RRR. Normal S1, S2. No murmurs, rubs or gallops.  Abdomen and : " Soft, non-tender, nondistended, BS, no hepatosplenomegaly, no masses.  Extremities: no edemas, no extremity lymphadenopathy, no clubbing, no cyanosis.  Mild pain with mobilization (rotation) of the right hip  Nervous System: Alert and oriented x 3, good concentration, no deficits.  Skin: Normal temperature, turgor and texture; no rash, ulcers or subcutaneous nodules  Psych: Appropriate affect, alert and oriented to person, place and time          Assessment:       1. Annual physical exam  Assessment & Plan:  Patient is in overall good general health.  Stable medical conditions listed on the PMH.  Labs reviewed and patient notified.        2. Hyperlipidemia, unspecified hyperlipidemia type  Assessment & Plan:  Lab Results   Component Value Date    CHOL 209 (H) 06/27/2025    CHOL 194 05/23/2024    CHOL 181 05/26/2023     Lab Results   Component Value Date    HDL 51 06/27/2025    HDL 51 05/23/2024    HDL 55 05/26/2023     Lab Results   Component Value Date    LDLCALC 135.4 06/27/2025    LDLCALC 125.4 05/23/2024    LDLCALC 112.6 05/26/2023     Lab Results   Component Value Date    TRIG 113 06/27/2025    TRIG 88 05/23/2024    TRIG 67 05/26/2023       Lab Results   Component Value Date    CHOLHDL 24.4 06/27/2025    CHOLHDL 26.3 05/23/2024    CHOLHDL 30.4 05/26/2023     Continue diet and physical activity      3. Primary hypertension  Assessment & Plan:  Controlled however having spikes of BP at home  Will start patient on ACEi  Lisinopril 10 mg qd  Refilled  Recommended to monitor blood pressure regularly, eat a well-balanced diet that's low in salt, DASH diet, enjoy regular physical activity, manage stress, maintain a healthy weight, take medications properly.      Orders:  -     lisinopriL 10 MG tablet; Take 1 tablet (10 mg total) by mouth once daily.  Dispense: 90 tablet; Refill: 3    4. ETIENNE (obstructive sleep apnea)  Assessment & Plan:  Patient is using her CPAP, however patient experiences daily fatigue despite CPAP  use and she continues to experience weight gain.    I am starting patient on is a bound  Continue CPAP    Orders:  -     tirzepatide, weight loss, (ZEPBOUND) 2.5 mg/0.5 mL PnIj; Inject 2.5 mg into the skin every 7 days.  Dispense: 2 mL; Refill: 0  -     tirzepatide, weight loss, (ZEPBOUND) 5 mg/0.5 mL PnIj; Inject 5 mg into the skin every 7 days.  Dispense: 6 mL; Refill: 0    5. Hip pain, bilateral  Assessment & Plan:  Mild to moderate  S/P 50 sessions of physical therapy  I am referring patient to Sports Medicine for evaluation    Orders:  -     Ambulatory referral/consult to Sports Medicine; Future; Expected date: 07/04/2025    6. Menopausal syndrome (hot flashes)  Assessment & Plan:  I am starting patient on low-dose paroxetine.    I have explained side effects, such as dry mouth, constipation.  Have recommended to take medication in the morning and abundant fluids during the day.    Orders:  -     paroxetine (PAXIL) 10 MG tablet; Take 1 tablet (10 mg total) by mouth every morning.  Dispense: 30 tablet; Refill: 11    7. Class 2 obesity due to excess calories without serious comorbidity with body mass index (BMI) of 36.0 to 36.9 in adult  Assessment & Plan:  Weight gain noticed    Today's weight: 100.3 kg (221 lb 1.9 oz)  Today's BMI: Body mass index is 36.8 kg/m².  Wt Readings from Last 3 Encounters:   06/27/25 100.3 kg (221 lb 1.9 oz)   05/29/25 101.6 kg (223 lb 15.8 oz)   11/21/24 101.6 kg (223 lb 15.8 oz)     Continue lifestyle modification  Continue weight loss        8. Status post laparoscopic sleeve gastrectomy  -     omeprazole (PRILOSEC) 40 MG capsule; Take 1 capsule (40 mg total) by mouth once daily.  Dispense: 90 capsule; Refill: 3    9. Gastroesophageal reflux disease without esophagitis  -     omeprazole (PRILOSEC) 40 MG capsule; Take 1 capsule (40 mg total) by mouth once daily.  Dispense: 90 capsule; Refill: 3    10. Chronic insomnia  Assessment & Plan:  Continue CPAP management.    Continue  trazodone p.r.n..    Orders:  -     traZODone (DESYREL) 50 MG tablet; Take 0.5 tablets (25 mg total) by mouth nightly as needed for Insomnia.  Dispense: 45 tablet; Refill: 3    11. Healthcare maintenance  Assessment & Plan:  Health care maintenance and core gaps reviewed and assessed with patient.  Vaccinations recommended:        Tetanus - 2017       Shingles - n/a       Influenza - n/a       Pneumonia - n/a  Colon cancer screening:   Colonoscopy: 2017  Lifestyle recommendations given.  Physical activity recommended.    Legend: Ordered (O), Declined (D), Current (C)      Orders:  -     Tdap Vaccine; Future  -     pneumoc 20-letha conj-dip cr,PF, (PREVNAR 20, PF,) 0.5 mL Syrg injection; Inject 0.5 mLs into the muscle once. for 1 dose  Dispense: 0.5 mL; Refill: 0    12. Mass of lower outer quadrant of left breast  Assessment & Plan:  Concern for small left sided, lower outer quadrant small mass?  I am ordering diagnostic mammogram.    Orders:  -     Mammo Digital Diagnostic Bilat; Future; Expected date: 06/27/2025       Summary of orders / plan:         Medications refilled.    Paroxetine  Zepbound   Tdap  Prevnar 20          Patient Instructions   Vaccinations recommended:  Tdap            for Tetanus  Prevnar 20   for Pneumonia       Problem list updated  Medications reconciled  Education provided  Lifestyle recommendations given  AVS generated, explained, and sent to the patient.  THANK YOU  RTC in : 1 year for annual wellness visit, labs ordered\            AMADOU THACKER MD, MPH  Internal Medicine  [facility]  [facility]

## 2025-06-27 NOTE — ASSESSMENT & PLAN NOTE
I am starting patient on low-dose paroxetine.    I have explained side effects, such as dry mouth, constipation.  Have recommended to take medication in the morning and abundant fluids during the day.

## 2025-06-27 NOTE — ASSESSMENT & PLAN NOTE
Weight gain noticed    Today's weight: 100.3 kg (221 lb 1.9 oz)  Today's BMI: Body mass index is 36.8 kg/m².  Wt Readings from Last 3 Encounters:   06/27/25 100.3 kg (221 lb 1.9 oz)   05/29/25 101.6 kg (223 lb 15.8 oz)   11/21/24 101.6 kg (223 lb 15.8 oz)     Continue lifestyle modification  Continue weight loss

## 2025-06-27 NOTE — ASSESSMENT & PLAN NOTE
Patient is using her CPAP, however patient experiences daily fatigue despite CPAP use and she continues to experience weight gain.    I am starting patient on is a bound  Continue CPAP

## 2025-06-30 ENCOUNTER — TELEPHONE (OUTPATIENT)
Dept: RADIOLOGY | Facility: HOSPITAL | Age: 55
End: 2025-06-30
Payer: COMMERCIAL

## 2025-07-02 ENCOUNTER — RESULTS FOLLOW-UP (OUTPATIENT)
Dept: INTERNAL MEDICINE | Facility: CLINIC | Age: 55
End: 2025-07-02

## 2025-07-02 ENCOUNTER — HOSPITAL ENCOUNTER (OUTPATIENT)
Dept: RADIOLOGY | Facility: HOSPITAL | Age: 55
Discharge: HOME | End: 2025-07-02
Attending: STUDENT IN AN ORGANIZED HEALTH CARE EDUCATION/TRAINING PROGRAM
Payer: COMMERCIAL

## 2025-07-02 DIAGNOSIS — N63.23 MASS OF LOWER OUTER QUADRANT OF LEFT BREAST: ICD-10-CM

## 2025-07-02 PROCEDURE — 77066 DX MAMMO INCL CAD BI: CPT | Mod: 26,,, | Performed by: RADIOLOGY

## 2025-07-02 PROCEDURE — 77066 DX MAMMO INCL CAD BI: CPT | Mod: TC

## 2025-07-02 PROCEDURE — 76642 ULTRASOUND BREAST LIMITED: CPT | Mod: TC,LT

## 2025-07-02 PROCEDURE — 76642 ULTRASOUND BREAST LIMITED: CPT | Mod: 26,LT,, | Performed by: RADIOLOGY

## 2025-07-02 PROCEDURE — 77062 BREAST TOMOSYNTHESIS BI: CPT | Mod: 26,,, | Performed by: RADIOLOGY

## 2025-07-03 ENCOUNTER — RESULTS FOLLOW-UP (OUTPATIENT)
Dept: INTERNAL MEDICINE | Facility: CLINIC | Age: 55
End: 2025-07-03

## 2025-07-03 ENCOUNTER — PATIENT MESSAGE (OUTPATIENT)
Dept: ORTHOPEDICS | Facility: CLINIC | Age: 55
End: 2025-07-03
Payer: COMMERCIAL